# Patient Record
Sex: FEMALE | Employment: STUDENT | URBAN - METROPOLITAN AREA
[De-identification: names, ages, dates, MRNs, and addresses within clinical notes are randomized per-mention and may not be internally consistent; named-entity substitution may affect disease eponyms.]

---

## 2018-02-20 ENCOUNTER — OFFICE VISIT (OUTPATIENT)
Dept: ORTHOPEDICS | Facility: CLINIC | Age: 23
End: 2018-02-20
Payer: COMMERCIAL

## 2018-02-20 VITALS — RESPIRATION RATE: 16 BRPM | SYSTOLIC BLOOD PRESSURE: 104 MMHG | DIASTOLIC BLOOD PRESSURE: 64 MMHG | HEART RATE: 68 BPM

## 2018-02-20 DIAGNOSIS — S76.212A GROIN STRAIN, LEFT, INITIAL ENCOUNTER: Primary | ICD-10-CM

## 2018-02-20 NOTE — LETTER
2/20/2018      RE: Val Jenkins  [address] [facility] [address]       Sports Medicine Clinic Visit    PCP: No primary care provider on file.    Val Jenkins is a 22 year old female who is seen  as self referral presenting with left hip pain. The pt believes pain started after over stretching the hip flexor area. She dances and work out. The hip has improved with rest, ice, and ibuprofen. She wants to make sure she is alright.     Injury: None    Location of Pain: left groin pain  Duration of Pain: 2 week(s)  Pain is better with: Ice, Ibuprofen and Rest  Pain is worse with: Hip flexion, abduction, external rotation  Additional Features: None  Treatment so far consists of: Ice, ibuprofen, and rest  Prior History of related problems: None    /64 (BP Location: Right arm, Patient Position: Sitting, Cuff Size: Adult Regular)  Pulse 68  Resp 16         PMH:  Active problem list:  Patient Active Problem List   Diagnosis     Metatarsalgia of left foot       FH:  No family history on file.    SH:  Social History     Social History     Marital status: Single     Spouse name: N/A     Number of children: N/A     Years of education: N/A     Occupational History     Not on file.     Social History Main Topics     Smoking status: Never Smoker     Smokeless tobacco: Never Used     Alcohol use Not on file     Drug use: Not on file     Sexual activity: Not on file     Other Topics Concern     Not on file     Social History Narrative       MEDS:  See EMR, reviewed  ALL:  See EMR, reviewed    REVIEW OF SYSTEMS:  CONSTITUTIONAL:NEGATIVE for fever, chills, change in weight  INTEGUMENTARY/SKIN: NEGATIVE for worrisome rashes, moles or lesions  EYES: NEGATIVE for vision changes or irritation  ENT/MOUTH: NEGATIVE for ear, mouth and throat problems  RESP:NEGATIVE for significant cough or SOB  BREAST: NEGATIVE for masses, tenderness or discharge  CV: NEGATIVE for chest pain, palpitations or peripheral edema  GI:  NEGATIVE for nausea, abdominal pain, heartburn, or change in bowel habits  :NEGATIVE for frequency, dysuria, or hematuria  :NEGATIVE for frequency, dysuria, or hematuria  NEURO: NEGATIVE for weakness, dizziness or paresthesias  ENDOCRINE: NEGATIVE for temperature intolerance, skin/hair changes  HEME/ALLERGY/IMMUNE: NEGATIVE for bleeding problems  PSYCHIATRIC: NEGATIVE for changes in mood or affect        Subjective: 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 There was no forceful injury. She is involved in jazz and modern dance. She is taken time away, and done some gentle stretches, and feels that it's improving. She started to insert herself back into performance just avoiding those parts of dance that are at risk for discomfort. She denies issues with her hips in the past.    Objective: She has full range of motion of the left hip to internal rotation and external rotation and abduction. She is nontender over the anterior superior iliac spine and hip flexion against resistance does not cause discomfort. She is consistently tender over the adductor tubercle on the left and firing of the adductors against resistance reproduces her discomfort. She is nontender directly over the pubic symphysis. She has normal range of motion to the lumbar spine. The PSIS excurse normally. She is nontender over the sacroiliac joints and a Brittany test is negative.  Distal pulses and sensation are intact.    Assessment: Adductor tendon strain left hip     Plan: The problem is improving. We went over some Hold and release stretches for the adductors as well as the hip flexors with a gentle progression. She was able to do them without discomfort. We talked about an approach to return to dance. She'll graduate this year. She is performing at the [employer] and rehearsals again in a few months.              Carlitos Tinsley  MD Moe

## 2018-02-20 NOTE — PROGRESS NOTES
Sports Medicine Clinic Visit    PCP: No primary care provider on file.    Val Jenkins is a 22 year old female who is seen  as self referral presenting with left hip pain. The pt believes pain started after over stretching the hip flexor area. She dances and work out. The hip has improved with rest, ice, and ibuprofen. She wants to make sure she is alright.     Injury: None    Location of Pain: left groin pain  Duration of Pain: 2 week(s)  Pain is better with: Ice, Ibuprofen and Rest  Pain is worse with: Hip flexion, abduction, external rotation  Additional Features: None  Treatment so far consists of: Ice, ibuprofen, and rest  Prior History of related problems: None    /64 (BP Location: Right arm, Patient Position: Sitting, Cuff Size: Adult Regular)  Pulse 68  Resp 16         PMH:  Active problem list:  Patient Active Problem List   Diagnosis     Metatarsalgia of left foot       FH:  No family history on file.    SH:  Social History     Social History     Marital status: Single     Spouse name: N/A     Number of children: N/A     Years of education: N/A     Occupational History     Not on file.     Social History Main Topics     Smoking status: Never Smoker     Smokeless tobacco: Never Used     Alcohol use Not on file     Drug use: Not on file     Sexual activity: Not on file     Other Topics Concern     Not on file     Social History Narrative       MEDS:  See EMR, reviewed  ALL:  See EMR, reviewed    REVIEW OF SYSTEMS:  CONSTITUTIONAL:NEGATIVE for fever, chills, change in weight  INTEGUMENTARY/SKIN: NEGATIVE for worrisome rashes, moles or lesions  EYES: NEGATIVE for vision changes or irritation  ENT/MOUTH: NEGATIVE for ear, mouth and throat problems  RESP:NEGATIVE for significant cough or SOB  BREAST: NEGATIVE for masses, tenderness or discharge  CV: NEGATIVE for chest pain, palpitations or peripheral edema  GI: NEGATIVE for nausea, abdominal pain, heartburn, or change in bowel habits  :NEGATIVE for  frequency, dysuria, or hematuria  :NEGATIVE for frequency, dysuria, or hematuria  NEURO: NEGATIVE for weakness, dizziness or paresthesias  ENDOCRINE: NEGATIVE for temperature intolerance, skin/hair changes  HEME/ALLERGY/IMMUNE: NEGATIVE for bleeding problems  PSYCHIATRIC: NEGATIVE for changes in mood or affect        Subjective: 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 There was no forceful injury. She is involved in jazz and modern dance. She is taken time away, and done some gentle stretches, and feels that it's improving. She started to insert herself back into performance just avoiding those parts of dance that are at risk for discomfort. She denies issues with her hips in the past.    Objective: She has full range of motion of the left hip to internal rotation and external rotation and abduction. She is nontender over the anterior superior iliac spine and hip flexion against resistance does not cause discomfort. She is consistently tender over the adductor tubercle on the left and firing of the adductors against resistance reproduces her discomfort. She is nontender directly over the pubic symphysis. She has normal range of motion to the lumbar spine. The PSIS excurse normally. She is nontender over the sacroiliac joints and a Brittany test is negative.  Distal pulses and sensation are intact.    Assessment: Adductor tendon strain left hip     Plan: The problem is improving. We went over some Hold and release stretches for the adductors as well as the hip flexors with a gentle progression. She was able to do them without discomfort. We talked about an approach to return to dance. She'll graduate this year. She is performing at the [employer] and rehearsals again in a few months.

## 2018-02-20 NOTE — MR AVS SNAPSHOT
After Visit Summary   2018    Val Jenkins    MRN: [id number]           Patient Information     Date Of Birth          1995        Visit Information        Provider Department      2018 10:30 AM Carlitos Rosa MD [facility] Sports Medicine        Today's Diagnoses     Groin strain, left, initial encounter    -  1       Follow-ups after your visit        Who to contact     Please call your clinic at [phone] to:    Ask questions about your health    Make or cancel appointments    Discuss your medicines    Learn about your test results    Speak to your doctor            Additional Information About Your Visit        MyChart Information     [employer] is an electronic gateway that provides easy, online access to your medical records. With [employer], you can request a clinic appointment, read your test results, renew a prescription or communicate with your care team.     To sign up for [employer] visit the website at [email or web address]   You will be asked to enter the access code listed below, as well as some personal information. Please follow the directions to create your username and password.     Your access code is: [email or web address]  Expires: 2018  6:30 AM     Your access code will  in 90 days. If you need help or a new code, please contact your [facility] or call [phone] for assistance.        Care EveryWhere ID     This is your Care EveryWhere ID. This could be used by other organizations to access your [facility] medical records  [id number]        Your Vitals Were     Pulse Respirations                68 16           Blood Pressure from Last 3 Encounters:   18 104/64    Weight from Last 3 Encounters:   16 120 lb (54.4 kg)   16 120 lb (54.4 kg) (34 %)*     * Growth percentiles are based on CDC 2-20 Years data.              Today, you had the following     No orders found for display       Primary Care Provider     None Specified       No primary provider on file.        Equal Access to Services     WILMER GONZALEZ : Hadii aad ku hadmirelabasil Marino, waalexda tatyanamacielha, heatherkurt mullenhonorioneeraj caballero. So [facility] [phone].    ATENCIÓN: Si habla español, tiene a reed disposición servicios gratuitos de asistencia lingüística. Llame al [phone].    We comply with applicable federal civil rights laws and [place] laws. We do not discriminate on the basis of race, color, national origin, age, disability, sex, sexual orientation, or gender identity.            Thank you!     Thank you for choosing [facility]  for your care. Our goal is always to provide you with excellent care. Hearing back from our patients is one way we can continue to improve our services. Please take a few minutes to complete the written survey that you may receive in the mail after your visit with us. Thank you!             Your Updated Medication List - Protect others around you: Learn how to safely use, store and throw away your medicines at www.disposemymeds.org.          This list is accurate as of 2/20/18 11:08 AM.  Always use your most recent med list.                   Brand Name Dispense Instructions for use Diagnosis    LEXAPRO PO      Take 12.5 mg by mouth daily        norethin-eth estrad triphasic 0.5/1/0.5-35 MG-MCG per tablet    ARANELLRAIN, PAKO, TRI-NORINYL          omeprazole 10 MG CR capsule    priLOSEC          sertraline 12.5 mg Tabs half-tab    ZOLOFT     Take 12.5 mg by mouth daily

## 2018-05-16 ENCOUNTER — APPOINTMENT (OUTPATIENT)
Dept: GENERAL RADIOLOGY | Facility: CLINIC | Age: 23
End: 2018-05-16
Attending: EMERGENCY MEDICINE
Payer: OTHER MISCELLANEOUS

## 2018-05-16 ENCOUNTER — APPOINTMENT (OUTPATIENT)
Dept: ULTRASOUND IMAGING | Facility: CLINIC | Age: 23
End: 2018-05-16
Attending: EMERGENCY MEDICINE
Payer: OTHER MISCELLANEOUS

## 2018-05-16 ENCOUNTER — HOSPITAL ENCOUNTER (EMERGENCY)
Facility: CLINIC | Age: 23
Discharge: HOME | End: 2018-05-17
Attending: EMERGENCY MEDICINE | Admitting: EMERGENCY MEDICINE
Payer: OTHER MISCELLANEOUS

## 2018-05-16 DIAGNOSIS — M25.561 ACUTE PAIN OF RIGHT KNEE: ICD-10-CM

## 2018-05-16 PROCEDURE — 93971 EXTREMITY STUDY: CPT | Mod: RT

## 2018-05-16 PROCEDURE — 73562 X-RAY EXAM OF KNEE 3: CPT | Mod: RT

## 2018-05-16 PROCEDURE — 99284 EMERGENCY DEPT VISIT MOD MDM: CPT | Mod: 25 | Performed by: EMERGENCY MEDICINE

## 2018-05-16 PROCEDURE — 99283 EMERGENCY DEPT VISIT LOW MDM: CPT | Mod: Z6 | Performed by: EMERGENCY MEDICINE

## 2018-05-16 ASSESSMENT — ENCOUNTER SYMPTOMS
FATIGUE: 0
ABDOMINAL PAIN: 0
ARTHRALGIAS: 1
FEVER: 0
SHORTNESS OF BREATH: 0

## 2018-05-16 NOTE — ED AVS SNAPSHOT
[facility], Emergency Department    [address]    [place] [address]    Phone:  [phone]    Fax:  [phone]                                       Val Jenkins   MRN: [id number]    Department:  [facility], Emergency Department   Date of Visit:  5/16/2018           Patient Information     Date Of Birth          1995        Your diagnoses for this visit were:     Acute pain of right knee        You were seen by Iraida Brown MD.        Discharge Instructions       Please make an appointment to follow up with Orthopedics (phone: [phone]) as soon as possible.    You should receive a call from [facility] to schedule your follow up appointment. If you do not hear from them within 24 business hours, call [phone], option 3 for help in scheduling your follow up.  If you are seen in the Emergency Department over the weekend, you will receive a phone call on the next business day.     You may use topical voltaren cream to help with pain.  Continue to ice, rest and elevate as much as possible.      Knee Pain  Knee pain is very common. It s especially common in active people who put a lot of pressure on their knees, like runners. It affects women more often than men.  Your kneecap (patella) is a thick, round bone. It covers and protects the front portion of your knee joint. It moves along a groove in your thighbone (femur) as part of the patellofemoral joint. A layer of cartilage surrounds the underside of your kneecap. This layer protects it from grinding against your femur.  When this cartilage softens and breaks down, it can cause knee pain. This is partly because of repetitive stress. The stress irritates the lining of the joint. This causes pain in the underlying bone.  What causes knee pain?  Many things can cause knee pain. You may have more than one cause. Some of these include:    Overuse of the knee joint    The kneecap doesn t line up with the  tissue around it    Damage to small nerves in the area    Damage to the ligament-like structure that holds the kneecap in place (retinaculum)    Breakdown of the bone under the cartilage    Swelling in the soft tissues around the kneecap    Injury  You might be more likely to have knee pain if you:    Exercise a lot    Recently increased the intensity of your workouts    Have a body mass index (BMI) greater than 25    Have poor alignment of your kneecap    Walk with your feet turned overly outward or inward    Have weakness in surrounding muscle groups (inner quad or hip adductor muscles)    Have too much tightness in surrounding muscle groups (hamstrings or iliotibial band)    Have a recent history of injury to the area    Are female  Symptoms of knee pain  This type of knee pain is a dull, aching pain in the front of the knee in the area under and around the kneecap. This pain may start quickly or slowly. Your pain might be worse when you squat, run, or sit for a long time. You might also sometimes feel like your knee is giving out. You may have symptoms in one or both of your knees.  Diagnosing knee pain  Your healthcare provider will ask about your medical history and your symptoms. Be sure to describe any activities that make your knee pain worse. He or she will look at your knee. This will include tests of your range of motion, strength, and areas of pain of your knee. Your knee alignment will be checked.  Your healthcare provider will need to rule out other causes of your knee pain, such as arthritis. You may need an imaging test, such as an X-ray or MRI.  Treatment for knee pain  Treatments that can help ease your symptoms may include:    Avoiding activities for a while that make your pain worse, returning to activity over time    Icing the outside of your knee when it causes you pain    Taking over-the-counter pain medicine    Wearing a knee brace or taping your knee to support it    Wearing special shoe  inserts to help keep your feet in the proper alignment    Doing special exercises to stretch and strengthen the muscles around your hip and your knee  These steps help most people manage knee pain. But some cases of knee pain need to be treated with surgery. You may need surgery right away. Or you may need it later if other treatments don t work. Your healthcare provider may refer you to an orthopedic surgeon. He or she will talk with you about your choices.  Preventing knee pain  Losing weight and correcting excess muscle tightness or muscle weakness may help lower your risk.  In some cases, you can prevent knee pain. To help prevent a flare-up of knee pain, you do these things:    Regularly do all the exercises your doctor or physical therapist advises    Support your knee as advised by your doctor or physical therapist    Increase training gradually, and ease up on training when needed    Have an expert check your gait for running or other sporting activities    Stretch properly before and after exercise    Replace your running shoes regularly    Lose excess weight      When to call your healthcare provider  Call your healthcare provider right away if:    Your symptoms don t get better after a few weeks of treatment    You have any new symptoms   Date Last Reviewed: 4/1/2017 2000-2017 [employer]. [address] [place] [address]. All rights reserved. This information is not intended as a substitute for professional medical care. Always follow your healthcare professional's instructions.               24 Hour Appointment Hotline       To make an appointment at any [facility], call [phone] ([phone]). If you don't have a family doctor or clinic, we will help you find one. [facility] clinics are conveniently located to serve the needs of you and your family.          ED Discharge Orders     Follow up with Orthopaedics [facility]       You should receive a call from [facility] to schedule your follow up appointment. If you do not hear from them within 24 business hours, call [phone], option 3 for help in scheduling your follow up.  If you are seen in the Emergency Department over the weekend, you will receive a phone call on the next business day.                     Review of your medicines      START taking        Dose / Directions Last dose taken    diclofenac 1 % Gel topical gel   Commonly known as:  VOLTAREN   Dose:  4 g   Quantity:  100 g        Place 4 g onto the skin 4 times daily as needed for moderate pain Apply to right knee   Refills:  0          Our records show that you are taking the medicines listed below. If these are incorrect, please call your family doctor or clinic.        Dose / Directions Last dose taken    norethin-eth estrad triphasic 0.5/1/0.5-35 MG-MCG per tablet   Commonly known as:  ARANELLE, PAKO, TRI-NORINYL        Refills:  0                Prescriptions were sent or printed at these locations (1 Prescription)                   Other Prescriptions                Printed at Department/Unit printer (1 of 1)         diclofenac (VOLTAREN) 1 % GEL topical gel                Procedures and tests performed during your visit     US Lower Extremity Venous Duplex Right    XR Knee Right 3 Views      Orders Needing Specimen Collection     None      Pending Results     Date and Time Order Name Status Description    5/16/2018 2145 US Lower Extremity Venous Duplex Right Preliminary     5/16/2018 2145 XR Knee Right 3 Views Preliminary             Pending Culture Results     No orders found for last 3 day(s).            Pending Results Instructions     If you had any lab results that were not finalized at the time of your Discharge, you can call the ED Lab Result RN at [phone]. You will be contacted by this team for any positive Lab results or changes in treatment. The nurses are available 7 days a week from 10A to 6:30P.  You can leave a message  "24 hours per day and they will return your call.        Thank you for choosing [facility]       Thank you for choosing [facility] for your care. Our goal is always to provide you with excellent care. Hearing back from our patients is one way we can continue to improve our services. Please take a few minutes to complete the written survey that you may receive in the mail after you visit with us. Thank you!        [facility] Information     [employer] lets you send messages to your doctor, view your test results, renew your prescriptions, schedule appointments and more. To sign up, go to [email or web address] . Click on \"Log in\" on the left side of the screen, which will take you to the Welcome page. Then click on \"Sign up Now\" on the right side of the page.     You will be asked to enter the access code listed below, as well as some personal information. Please follow the directions to create your username and password.     Your access code is: [email or web address]  Expires: 2018  7:30 AM     Your access code will  in 90 days. If you need help or a new code, please call your [facility] clinic or [phone].        Care EveryWhere ID     This is your Care EveryWhere ID. This could be used by other organizations to access your [facility] medical records  [id number]        Equal Access to Services     WILMER GONZALEZ : Julius Marino, waaxda luqadaha, qaybta kaalmada adecaroleyaetienne, neeraj maloney. So [facility] [phone].    ATENCIÓN: Si habla español, tiene a reed disposición servicios gratuitos de asistencia lingüística. Llame al [phone].    We comply with applicable federal civil rights laws and [place] laws. We do not discriminate on the basis of race, color, national origin, age, disability, sex, sexual orientation, or gender identity.            After Visit Summary       This is your record. Keep this with you and show to your community pharmacist(s) and doctor(s) at your next " visit.

## 2018-05-16 NOTE — ED AVS SNAPSHOT
[facility], [place], Emergency Department    [address]    [facility] [address]    Phone:  [phone]    Fax:  [phone]                                       Val Jenkins   MRN: [id number]    Department:  [facility], Emergency Department   Date of Visit:  5/16/2018           After Visit Summary Signature Page     I have received my discharge instructions, and my questions have been answered. I have discussed any challenges I see with this plan with the nurse or doctor.    ..........................................................................................................................................  Patient/Patient Representative Signature      ..........................................................................................................................................  Patient Representative Print Name and Relationship to Patient    ..................................................               ................................................  Date                                            Time    ..........................................................................................................................................  Reviewed by Signature/Title    ...................................................              ..............................................  Date                                                            Time

## 2018-05-17 ENCOUNTER — OFFICE VISIT (OUTPATIENT)
Dept: ORTHOPEDICS | Facility: CLINIC | Age: 23
End: 2018-05-17
Payer: COMMERCIAL

## 2018-05-17 ENCOUNTER — RADIANT APPOINTMENT (OUTPATIENT)
Dept: MRI IMAGING | Facility: CLINIC | Age: 23
End: 2018-05-17
Attending: FAMILY MEDICINE
Payer: COMMERCIAL

## 2018-05-17 ENCOUNTER — TELEPHONE (OUTPATIENT)
Dept: ORTHOPEDICS | Facility: CLINIC | Age: 23
End: 2018-05-17

## 2018-05-17 VITALS
HEIGHT: 64 IN | BODY MASS INDEX: 21.34 KG/M2 | WEIGHT: 125 LBS | DIASTOLIC BLOOD PRESSURE: 81 MMHG | SYSTOLIC BLOOD PRESSURE: 117 MMHG | HEART RATE: 72 BPM

## 2018-05-17 VITALS
DIASTOLIC BLOOD PRESSURE: 81 MMHG | WEIGHT: 125.1 LBS | BODY MASS INDEX: 21.47 KG/M2 | SYSTOLIC BLOOD PRESSURE: 117 MMHG | RESPIRATION RATE: 16 BRPM | HEART RATE: 72 BPM | TEMPERATURE: 98.2 F | OXYGEN SATURATION: 99 %

## 2018-05-17 DIAGNOSIS — M22.2X1 PATELLOFEMORAL PAIN SYNDROME OF RIGHT KNEE: Primary | ICD-10-CM

## 2018-05-17 DIAGNOSIS — M25.561 RIGHT ANTERIOR KNEE PAIN: Primary | ICD-10-CM

## 2018-05-17 NOTE — TELEPHONE ENCOUNTER
Called patient to get her onto a sports medicine schedule.     Appointment with Dr. Tsai was offered on Monday at 4:30pm.     Patient states she will schedule but may show up to the Sports and ortho walk in clinic before then.     Sara Silverio LPN

## 2018-05-17 NOTE — ED PROVIDER NOTES
History     Chief Complaint   Patient presents with     Knee Pain     Right knee pain swelling due to dancing. Has used Ibuprofen, elevation, and iced it with some relief.  Denies numbness and tingling. Seeing MRI per patient on knee.     HPI  Val Jenkins is a 23 year old female with no significant medical history who presents to the Emergency Department for evaluation of right knee pain. 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  Pain is primarily in the anterior knee along the patellar tendon insertion but also notes fullness in the posterior knee and pain radiating toward her hamstring and calf.  She denies any specific injury to the knee.  Denies any sensation of locking or clicking of the knee.  She states she went home and used ice, Advil, and elevation; however, she states she danced again today and noticed the pain was getting worse with some associated posterior knee and calf tightness.  She states she still has full range of motion, but thinks she has some mild swelling in her knee.  She denies any calf swelling.  She denies any history of PE/DVT.  She is on an oral contraceptive.  She denies any tobacco use.  She denies any other recent illness.  She is quite concerned about the cause of the pain because she is rehearsing daily and unable to take time off prior to an upcoming performance.      History reviewed. No pertinent past medical history.    History reviewed. No pertinent surgical history.    No family history on file.    Social History   Substance Use Topics     Smoking status: Never Smoker     Smokeless tobacco: Never Used     Alcohol use Yes       No current facility-administered medications for this encounter.      Current Outpatient Prescriptions   Medication     norethin-eth estrad triphasic (ARANELLE, PAKO, TRI-NORINYL) 0.5/1/0.5-35 MG-MCG per tablet      No Known Allergies      I have reviewed the Medications,  Allergies, Past Medical and Surgical History, and Social History in the Epic system.    Review of Systems   Constitutional: Negative for fatigue and fever.   Respiratory: Negative for shortness of breath.    Cardiovascular: Negative for chest pain and leg swelling.   Gastrointestinal: Negative for abdominal pain.   Musculoskeletal: Positive for arthralgias (right knee).   All other systems reviewed and are negative.      Physical Exam   BP: 112/74  Pulse: 75  Temp: 98.1  F (36.7  C)  Resp: 16  Weight: 56.7 kg (125 lb 1.6 oz)  SpO2: 98 %      Physical Exam  General: patient is alert and oriented and in no acute distress   Head: atraumatic and normocephalic   EENT: moist mucus membranes, sclera anicteric   Neck: supple   Cardiovascular: regular rate and rhythm, extremities warm and well perfused, no lower extremity edema, 2+DP and PT pulses  Pulmonary: no respiratory distress  Musculoskeletal: trace right knee effusion, TTP in the superior lateral knee and patellar tendon insertion, TTP in the posterior knee, able to fully flex and extend the knee, no laxity on varus/valgus strain, no laxity with anterior/posterior drawer, strength 5/5 with knee flexion and extension, full hip and ankle ROM  Neurological: alert and oriented, moving all extremities symmetrically, gait normal, sensation to light touch in RLE intact  Skin: warm, dry, no overlying warmth or erythema of the right knee    ED Course     ED Course     Procedures             Critical Care time:  none             Labs Ordered and Resulted from Time of ED Arrival Up to the Time of Departure from the ED - No data to display         Assessments & Plan (with Medical Decision Making)   23-year-old otherwise healthy female who presents with right knee pain.  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  She does report a fullness in the back of her knee as well as extending into her hamstring and calf.  She is on oral birth control and she  was taken for ultrasound to rule out DVT or Baker's cyst which is negative.  She did have x-rays of the knee that shows no acute fracture or bony abnormality.  She has no evidence of septic arthritis, gout, cellulitis or other emergent pathology.  She is neurovascularly intact.  Her pain is along the quadriceps and patella tendon insert.  I suspect this is secondary to strain due to overuse.  She does not have significant laxity on varus or valgus strain and has negative anterior and posterior drawer.  She denies any locking of the knee and does not have significant swelling to suggest meniscal injury.  She is quite concerned because she is supposed to be practicing daily for an upcoming dance performance and is unable to take time off to allow the need to rest and heal.  I have given her a referral for sports medicine clinic to be seen as soon as possible for further outpatient imaging as needed.  She was given Voltaren cream in the meantime and instructed to ice, rest and elevate as much as possible.  She was given close return instructions for the emergency department and voiced understanding.    I have reviewed the nursing notes.    I have reviewed the findings, diagnosis, plan and need for follow up with the patient.    Discharge Medication List as of 5/17/2018 12:35 AM      START taking these medications    Details   diclofenac (VOLTAREN) 1 % GEL topical gel Place 4 g onto the skin 4 times daily as needed for moderate pain Apply to right kneeDisp-100 g, R-0Local Print             Final diagnoses:   Acute pain of right knee   I, Camilo Lowery, am serving as a trained medical scribe to document services personally performed by Iraida Brown MD, based on the provider's statements to me.      IIraida MD, was physically present and have reviewed and verified the accuracy of this note documented by Camilo Lowery.       5/16/2018   [facility], EMERGENCY DEPARTMENT     Iraida Brown MD  05/17/18  100

## 2018-05-17 NOTE — DISCHARGE INSTRUCTIONS
Please make an appointment to follow up with Orthopedics (phone: [phone]) as soon as possible.    You should receive a call from [facility] to schedule your follow up appointment. If you do not hear from them within 24 business hours, call [phone], option 3 for help in scheduling your follow up.  If you are seen in the Emergency Department over the weekend, you will receive a phone call on the next business day.     You may use topical voltaren cream to help with pain.  Continue to ice, rest and elevate as much as possible.      Knee Pain  Knee pain is very common. It s especially common in active people who put a lot of pressure on their knees, like runners. It affects women more often than men.  Your kneecap (patella) is a thick, round bone. It covers and protects the front portion of your knee joint. It moves along a groove in your thighbone (femur) as part of the patellofemoral joint. A layer of cartilage surrounds the underside of your kneecap. This layer protects it from grinding against your femur.  When this cartilage softens and breaks down, it can cause knee pain. This is partly because of repetitive stress. The stress irritates the lining of the joint. This causes pain in the underlying bone.  What causes knee pain?  Many things can cause knee pain. You may have more than one cause. Some of these include:    Overuse of the knee joint    The kneecap doesn t line up with the tissue around it    Damage to small nerves in the area    Damage to the ligament-like structure that holds the kneecap in place (retinaculum)    Breakdown of the bone under the cartilage    Swelling in the soft tissues around the kneecap    Injury  You might be more likely to have knee pain if you:    Exercise a lot    Recently increased the intensity of your workouts    Have a body mass index (BMI) greater than 25    Have poor alignment of your kneecap    Walk with your feet turned overly outward or  inward    Have weakness in surrounding muscle groups (inner quad or hip adductor muscles)    Have too much tightness in surrounding muscle groups (hamstrings or iliotibial band)    Have a recent history of injury to the area    Are female  Symptoms of knee pain  This type of knee pain is a dull, aching pain in the front of the knee in the area under and around the kneecap. This pain may start quickly or slowly. Your pain might be worse when you squat, run, or sit for a long time. You might also sometimes feel like your knee is giving out. You may have symptoms in one or both of your knees.  Diagnosing knee pain  Your healthcare provider will ask about your medical history and your symptoms. Be sure to describe any activities that make your knee pain worse. He or she will look at your knee. This will include tests of your range of motion, strength, and areas of pain of your knee. Your knee alignment will be checked.  Your healthcare provider will need to rule out other causes of your knee pain, such as arthritis. You may need an imaging test, such as an X-ray or MRI.  Treatment for knee pain  Treatments that can help ease your symptoms may include:    Avoiding activities for a while that make your pain worse, returning to activity over time    Icing the outside of your knee when it causes you pain    Taking over-the-counter pain medicine    Wearing a knee brace or taping your knee to support it    Wearing special shoe inserts to help keep your feet in the proper alignment    Doing special exercises to stretch and strengthen the muscles around your hip and your knee  These steps help most people manage knee pain. But some cases of knee pain need to be treated with surgery. You may need surgery right away. Or you may need it later if other treatments don t work. Your healthcare provider may refer you to an orthopedic surgeon. He or she will talk with you about your choices.  Preventing knee pain  Losing weight and  correcting excess muscle tightness or muscle weakness may help lower your risk.  In some cases, you can prevent knee pain. To help prevent a flare-up of knee pain, you do these things:    Regularly do all the exercises your doctor or physical therapist advises    Support your knee as advised by your doctor or physical therapist    Increase training gradually, and ease up on training when needed    Have an expert check your gait for running or other sporting activities    Stretch properly before and after exercise    Replace your running shoes regularly    Lose excess weight      When to call your healthcare provider  Call your healthcare provider right away if:    Your symptoms don t get better after a few weeks of treatment    You have any new symptoms   Date Last Reviewed: 4/1/2017 2000-2017 [employer]. [address] [place] [address], [place] [address]. All rights reserved. This information is not intended as a substitute for professional medical care. Always follow your healthcare professional's instructions.

## 2018-05-17 NOTE — MR AVS SNAPSHOT
After Visit Summary   5/17/2018    Val Jenkins    MRN: [id number]           Patient Information     Date Of Birth          1995        Visit Information        Provider Department      5/17/2018 9:40 AM Doug Tsai MD [facility]        Today's Diagnoses     Right anterior knee pain    -  1       Follow-ups after your visit        Additional Services     PHYSICAL THERAPY REFERRAL (Internal)       Physical Therapy Referral                  Your next 10 appointments already scheduled     May 17, 2018  4:00 PM CDT   MR KNEE RIGHT W/O CONTRAST with [id number]   [facility] [place] ([facility])    [address] [address] [phone]           Take your medicines as usual, unless your doctor tells you not to. Bring a list of your current medicines to your exam (including vitamins, minerals and over-the-counter drugs). Also bring the results of similar scans you may have had.  Please remove any body piercings and hair extensions before you arrive.  Follow your doctor s orders. If you do not, we may have to postpone your exam.  You may or may not receive IV contrast for this exam pending the discretion of the Radiologist.  You do not need to do anything special to prepare.  The MRI machine uses a strong magnet. Please wear clothes without metal (snaps, zippers). A sweatsuit works well, or we may give you a hospital gown.   **IMPORTANT** THE INSTRUCTIONS BELOW ARE ONLY FOR THOSE PATIENTS WHO HAVE BEEN PRESCRIBED SEDATION OR GENERAL ANESTHESIA DURING THEIR MRI PROCEDURE:  IF YOUR DOCTOR PRESCRIBED ORAL SEDATION (take medicine to help you relax during your exam):   You must get the medicine from your doctor (oral medication) before you arrive. Bring the medicine to the exam. Do not take it at home. You ll be told when to take it upon arriving for your exam.   Arrive one hour early. Bring  someone who can take you home after the test. Your medicine will make you sleepy. After the exam, you may not drive, take a bus or take a taxi by yourself.  IF YOUR DOCTOR PRESCRIBED IV SEDATION:   Arrive one hour early. Bring someone who can take you home after the test. Your medicine will make you sleepy. After the exam, you may not drive, take a bus or take a taxi by yourself.   No eating 6 hours before your exam. You may have clear liquids up until 4 hours before your exam. (Clear liquids include water, clear tea, black coffee and fruit juice without pulp.)  IF YOUR DOCTOR PRESCRIBED ANESTHESIA (be asleep for your exam):   Arrive 1 1/2 hours early. Bring someone who can take you home after the test. You may not drive, take a bus or take a taxi by yourself.   No eating 8 hours before your exam. You may have clear liquids up until 4 hours before your exam. (Clear liquids include water, clear tea, black coffee and fruit juice without pulp.)   You will spend four to five hours in the recovery room.  Please call the Imaging Department at your exam site with any questions.            May 21, 2018  3:30 PM CDT   (Arrive by 3:15 PM)   DWIGHT Extremity with Deep Santamaria PT   [facility]    [address] [place] [address]  [address]  [place] [address] [phone]            May 21, 2018  4:30 PM CDT   (Arrive by 4:15 PM)   New Patient Visit with Doug Tsai MD   [facility] ([facility])    [address] [facility]  5th Floor  [facility] [address] [phone]              Future tests that were ordered for you today     Open Future Orders        Priority Expected Expires Ordered    MR Knee Right w/o Contrast Routine  5/17/2019 5/17/2018            Who to contact     Please call your clinic at [phone] to:    Ask questions about your health    Make or cancel appointments    Discuss your  "medicines    Learn about your test results    Speak to your doctor            Additional Information About Your Visit        MyChart Information     [employer] is an electronic gateway that provides easy, online access to your medical records. With [employer], you can request a clinic appointment, read your test results, renew a prescription or communicate with your care team.     To sign up for [employer] visit the website at [email or web address]   You will be asked to enter the access code listed below, as well as some personal information. Please follow the directions to create your username and password.     Your access code is: [email or web address]  Expires: 8/15/2018 10:43 AM     Your access code will  in 90 days. If you need help or a new code, please contact your [facility] or call [phone] for assistance.        Care EveryWhere ID     This is your Care EveryWhere ID. This could be used by other organizations to access your [facility] medical records  [id number]        Your Vitals Were     Pulse Height Last Period BMI (Body Mass Index)          72 5' 4\" (1.626 m) 05/15/2018 21.46 kg/m2         Blood Pressure from Last 3 Encounters:   18 117/81   18 117/81   18 104/64    Weight from Last 3 Encounters:   18 125 lb (56.7 kg)   18 125 lb 1.6 oz (56.7 kg)   16 120 lb (54.4 kg)              We Performed the Following     PHYSICAL THERAPY REFERRAL (Internal)        Primary Care Provider Fax #    Physician No Ref-Primary [phone]       No address on file        Equal Access to Services     [facility]: Hadii sarika arreaga Somaria fernanda, waaxda luqadaha, qaybta kaalmada neeraj borja . So [facility] [phone].    ATENCIÓN: Si habla español, tiene a reed disposición servicios gratuitos de asistencia lingüística. Llame al [phone].    We comply with applicable federal civil rights laws and [place] laws. We do not " discriminate on the basis of race, color, national origin, age, disability, sex, sexual orientation, or gender identity.            Thank you!     Thank you for choosing [facility]  for your care. Our goal is always to provide you with excellent care. Hearing back from our patients is one way we can continue to improve our services. Please take a few minutes to complete the written survey that you may receive in the mail after your visit with us. Thank you!             Your Updated Medication List - Protect others around you: Learn how to safely use, store and throw away your medicines at www.disposemymeds.org.          This list is accurate as of 5/17/18 10:43 AM.  Always use your most recent med list.                   Brand Name Dispense Instructions for use Diagnosis    diclofenac 1 % Gel topical gel    VOLTAREN    100 g    Place 4 g onto the skin 4 times daily as needed for moderate pain Apply to right knee        norethin-eth estrad triphasic 0.5/1/0.5-35 MG-MCG per tablet    PAKO BENSON TRI-NORINYL

## 2018-05-17 NOTE — PROGRESS NOTES
SPORTS & ORTHOPEDIC WALK-IN VISIT 5/17/2018    Primary Care Physician: Naga  2 days ago while dancing for rehearsal noticed some pain in her right knee. Started rehearsal about 2 days ago which she is dancing 4-5 hrs a day. Notice lateral knee pain. Denies any injury or hx of R knee pain.  Seen in ED yesterday. XR and US done- Both normal. Given RX for Voltaren gel  Reason for visit:     What part of your body is injured / painful?  right knee    What caused the injury /pain? Dancing    How long ago did your injury occur or pain begin? several days ago    What are your most bothersome symptoms? Pain    How would you characterize your symptom?  aching and sharp    What makes your symptoms better? Rest, Ice, Ibuprofen and Tylenol    What makes your symptoms worse? Other: dancing, feels when extended    Have you been previously seen for this problem? Yes, ED 5/16/18    Medical History:    Any recent changes to your medical history? No    Any new medication prescribed since last visit? No    Have you had surgery on this body part before? No    Social History:    Occupation: Student    Handedness: Right    Exercise: Dancing, [employer]    Review of Systems:    Do you have fever, chills, weight loss? No    Do you have any vision problems? No    Do you have any chest pain or edema? No    Do you have any shortness of breath or wheezing?  No    Do you have stomach problems? No    Do you have any numbness or focal weakness? No    Do you have diabetes? No    Do you have problems with bleeding or clotting? No    Do you have an rashes or other skin lesions? 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  Came on gradulally. Had discomfort 3 weeks ago in a show with squats and kneeling.  Then 2 days ago she started having discomfort iced and elevated and yesterday more pain.   She is a recent graduate of [email or web address] and has increased the volume of dancing  "the past few weeks in training for a production of [employer] which runs through August. She could not continue a rehearsal yesterday due to increasing R knee pain. She iced and was seen in ED with neg radiograph.  No popping or clicking. Pain worse with releve and knee extension, also in heels.        Hx achilles tendinitis in the L leg and calf.  In PT for her hip at the time.  Doing PT at [facility] for L hip impingment.    Feels diet is adequate, no restriction, weight is stable, on OCP.      Past Medical and Surgical History  No past medical history on file.       No Known Allergies  Current Outpatient Prescriptions   Medication Sig Dispense Refill     diclofenac (VOLTAREN) 1 % GEL topical gel Place 4 g onto the skin 4 times daily as needed for moderate pain Apply to right knee 100 g 0     norethin-eth estrad triphasic (ARANELLE, PAKO, TRI-NORINYL) 0.5/1/0.5-35 MG-MCG per tablet          Family Hx remarkable for   Val Jenkins does not use tobacco      ROS:  Constitutional no fevers sweats or chills  Eyes no vision change  Respiratory, no sob cough wheezing,   Cardiovascular no syncope, chest pain or palpitaitons,   Gastroenterology, no nausea, vomiting or abd pain, no stool incontinence  Genitourinary, no dysuria, no  Frequency, no urinary incontinence, no urinary retention  Integumentary-no recent rashes  Musculoskeletal see HPI otherwise negative  Psychiatric no depression or anxiety  Heme-no abnormal bleeding      Objective  /81  Pulse 72  Ht 5' 4\" (1.626 m)  Wt 125 lb (56.7 kg)  LMP 05/15/2018  BMI 21.46 kg/m2  Gen alert and pleasant    rightKnee Exam  No joint effusion, No redness;Tenderness superolateral pf facet prox patellar tendon and just posterior, also mid posterior knee; ROM without deficit; Strength 5/5 ext, 5/5 flexion;No pain or opening with varus or valgus stress test; Neg Lachmans; Neg McMurrays; pain with repetitive Squat test at the pf facet ; Neg patellar " apprehension test,  hip IR/ER did not cause pain  Pain with knee extension    Assessment  R knee pain    I suspect this is R patellofemoral knee pain and patellar tendinitis, the pain with extension could suggest hoffa's impingement. Within the differential of symptoms could be anterior meniscal pathology or stress reaction.    Plan  --we discussed given she is ramping up with a production and has increasing pain for an important production and the differential diagnosis we discussed MRI R knee today. If neg for stress reaction, anterior mensical injury or additional unstable injury, I would have her progress with activity as tolerated as well as support from dance therapist. We discussed Yolanda Hooks of [facility] or Elana Cook of [facility]  As potential dance therapists to eval and treat.    Doug Tsai MD CA[facility]

## 2018-05-17 NOTE — LETTER
5/17/2018     RE: Val Jenkins  [address]   [facility] [address]     Dear Colleague,    Thank you for referring your patient, Val Jenkins, to the [facility] SPORTS AND ORTHOPAEDIC WALK IN CLINIC at [facility]. Please see a copy of my visit note below.          SPORTS & ORTHOPEDIC WALK-IN VISIT 5/17/2018    Primary Care Physician: Naga  2 days ago while dancing for rehearsal noticed some pain in her right knee. Started rehearsal about 2 days ago which she is dancing 4-5 hrs a day. Notice lateral knee pain. Denies any injury or hx of R knee pain.  Seen in ED yesterday. XR and US done- Both normal. Given RX for Voltaren gel  Reason for visit:     What part of your body is injured / painful?  right knee    What caused the injury /pain? Dancing    How long ago did your injury occur or pain begin? several days ago    What are your most bothersome symptoms? Pain    How would you characterize your symptom?  aching and sharp    What makes your symptoms better? Rest, Ice, Ibuprofen and Tylenol    What makes your symptoms worse? Other: dancing, feels when extended    Have you been previously seen for this problem? Yes, ED 5/16/18    Medical History:    Any recent changes to your medical history? No    Any new medication prescribed since last visit? No    Have you had surgery on this body part before? No    Social History:    Occupation: Student    Handedness: Right    Exercise: Dancing, Orange [employer]    Review of Systems:    Do you have fever, chills, weight loss? No    Do you have any vision problems? No    Do you have any chest pain or edema? No    Do you have any shortness of breath or wheezing?  No    Do you have stomach problems? No    Do you have any numbness or focal weakness? No    Do you have diabetes? No    Do you have problems with bleeding or clotting? No    Do you have an rashes or other skin lesions? 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  Came on gradulally. Had discomfort 3 weeks ago in a show with squats and kneeling.  Then 2 days ago she started having discomfort iced and elevated and yesterday more pain.   She is a recent graduate of [employer] and has increased the volume of dancing the past few weeks in training for a production of [employer] at the [employer] which runs through August. She could not continue a rehearsal yesterday due to increasing R knee pain. She iced and was seen in ED with neg radiograph.  No popping or clicking. Pain worse with releve and knee extension, also in heels.      Hx achilles tendinitis in the L leg and calf.  In PT for her hip at the time.  Doing PT at [facility] for L hip impingment.    Feels diet is adequate, no restriction, weight is stable, on OCP.      Past Medical and Surgical History  No past medical history on file.       No Known Allergies  Current Outpatient Prescriptions   Medication Sig Dispense Refill     diclofenac (VOLTAREN) 1 % GEL topical gel Place 4 g onto the skin 4 times daily as needed for moderate pain Apply to right knee 100 g 0     norethin-eth estrad triphasic (ARANELLE, PAKO, TRI-NORINYL) 0.5/1/0.5-35 MG-MCG per tablet        Family Hx remarkable for   Val Quiñonessamantha does not use tobacco      ROS:  Constitutional no fevers sweats or chills  Eyes no vision change  Respiratory, no sob cough wheezing,   Cardiovascular no syncope, chest pain or palpitaitons,   Gastroenterology, no nausea, vomiting or abd pain, no stool incontinence  Genitourinary, no dysuria, no  Frequency, no urinary incontinence, no urinary retention  Integumentary-no recent rashes  Musculoskeletal see HPI otherwise negative  Psychiatric no depression or anxiety  Heme-no abnormal bleeding    Objective  /81  Pulse 72  Ht 5' 4\" (1.626 m)  Wt 125 lb (56.7 kg)  LMP 05/15/2018  BMI 21.46 kg/m2  Gen alert and pleasant    rightKnee Exam  No joint " effusion, No redness;Tenderness superolateral pf facet prox patellar tendon and just posterior, also mid posterior knee; ROM without deficit; Strength 5/5 ext, 5/5 flexion;No pain or opening with varus or valgus stress test; Neg Lachmans; Neg McMurrays; pain with repetitive Squat test at the pf facet ; Neg patellar apprehension test,  hip IR/ER did not cause pain  Pain with knee extension    Assessment  R knee pain    I suspect this is R patellofemoral knee pain and patellar tendinitis, the pain with extension could suggest hoffa's impingement. Within the differential of symptoms could be anterior meniscal pathology or stress reaction.    Plan  --we discussed given she is ramping up with a production and has increasing pain for an important production and the differential diagnosis we discussed MRI R knee today. If neg for stress reaction, anterior mensical injury or additional unstable injury, I would have her progress with activity as tolerated as well as support from dance therapist. We discussed Yolanda Hooks of [facility] or Elana Cook of [facility]  As potential dance therapists to eval and treat.    Doug Tsai MD CAQ

## 2018-05-17 NOTE — TELEPHONE ENCOUNTER
[facility] Call Center    Phone Message    May a detailed message be left on voicemail: yes    Reason for Call: pt calling because she wanted to let Dr Tsai know   Her knee is painful when walking which was not happening before she came to the appt. Is it ok to still dance?    Action Taken: Message routed to:  [facility] Sports Med

## 2018-05-17 NOTE — ED NOTES
Handoff report to PANKAJ Braswell.  Informed of course of ED stay and plan of care.  Michaelle verbalized understanding.

## 2018-05-21 ENCOUNTER — THERAPY VISIT (OUTPATIENT)
Dept: PHYSICAL THERAPY | Facility: CLINIC | Age: 23
End: 2018-05-21
Payer: OTHER MISCELLANEOUS

## 2018-05-21 DIAGNOSIS — M25.561 KNEE PAIN, RIGHT: Primary | ICD-10-CM

## 2018-05-21 PROCEDURE — 97112 NEUROMUSCULAR REEDUCATION: CPT | Mod: GP | Performed by: PHYSICAL THERAPIST

## 2018-05-21 PROCEDURE — 97161 PT EVAL LOW COMPLEX 20 MIN: CPT | Mod: GP | Performed by: PHYSICAL THERAPIST

## 2018-05-21 PROCEDURE — 97110 THERAPEUTIC EXERCISES: CPT | Mod: GP | Performed by: PHYSICAL THERAPIST

## 2018-05-21 RX ORDER — KETOROLAC TROMETHAMINE 10 MG/1
10 TABLET, FILM COATED ORAL DAILY PRN
Qty: 5 TABLET | Refills: 1 | Status: SHIPPED | OUTPATIENT
Start: 2018-05-21 | End: 2018-06-25

## 2018-05-21 ASSESSMENT — ACTIVITIES OF DAILY LIVING (ADL)
KNEE_ACTIVITY_OF_DAILY_LIVING_SCORE: 77.14
LIMPING: I DO NOT HAVE THE SYMPTOM
SIT WITH YOUR KNEE BENT: ACTIVITY IS NOT DIFFICULT
RISE FROM A CHAIR: ACTIVITY IS NOT DIFFICULT
STAND: ACTIVITY IS MINIMALLY DIFFICULT
WALK: ACTIVITY IS MINIMALLY DIFFICULT
STIFFNESS: I HAVE THE SYMPTOM BUT IT DOES NOT AFFECT MY ACTIVITY
RAW_SCORE: 54
PAIN: THE SYMPTOM AFFECTS MY ACTIVITY MODERATELY
GO DOWN STAIRS: ACTIVITY IS MINIMALLY DIFFICULT
AS_A_RESULT_OF_YOUR_KNEE_INJURY,_HOW_WOULD_YOU_RATE_YOUR_CURRENT_LEVEL_OF_DAILY_ACTIVITY?: NEARLY NORMAL
KNEE_ACTIVITY_OF_DAILY_LIVING_SUM: 54
SQUAT: ACTIVITY IS SOMEWHAT DIFFICULT
HOW_WOULD_YOU_RATE_THE_OVERALL_FUNCTION_OF_YOUR_KNEE_DURING_YOUR_USUAL_DAILY_ACTIVITIES?: NEARLY NORMAL
HOW_WOULD_YOU_RATE_THE_CURRENT_FUNCTION_OF_YOUR_KNEE_DURING_YOUR_USUAL_DAILY_ACTIVITIES_ON_A_SCALE_FROM_0_TO_100_WITH_100_BEING_YOUR_LEVEL_OF_KNEE_FUNCTION_PRIOR_TO_YOUR_INJURY_AND_0_BEING_THE_INABILITY_TO_PERFORM_ANY_OF_YOUR_USUAL_DAILY_ACTIVITIES?: 75
GO UP STAIRS: ACTIVITY IS MINIMALLY DIFFICULT
WEAKNESS: THE SYMPTOM AFFECTS MY ACTIVITY SLIGHTLY
KNEEL ON THE FRONT OF YOUR KNEE: ACTIVITY IS MINIMALLY DIFFICULT
GIVING WAY, BUCKLING OR SHIFTING OF KNEE: I DO NOT HAVE THE SYMPTOM
SWELLING: THE SYMPTOM AFFECTS MY ACTIVITY MODERATELY

## 2018-05-21 NOTE — TELEPHONE ENCOUNTER
Discussed pain relief strategies and she will d/c ibuprofen.  --she can use ketoralac 10 mg daily as needed for knee pain.  # 5 rx with 1 refill. Take with food and if not improved f/u.    Doug Tsai MD CAQ

## 2018-05-21 NOTE — PROGRESS NOTES
[facility] Initial Evaluation  Subjective:  Patient is a 23 year old female presenting with rehab right knee hpi. The history is provided by the patient.   Val Jenkins is a 23 year old female with a right knee condition.  Condition occurred with:  Repetition/overuse.  Condition occurred: for unknown reasons.  This is a new condition  Patient began having knee pain on 5/15/18. 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 She went from 5 hours a week up to 5 hours a day at times. .    Patient reports pain:  Lateral and sub patellar.    Pain is described as aching and sharp and is intermittent and reported as 5/10.  Associated symptoms:  Loss of motion/stiffness. Pain is worse in the P.M..  Exacerbated by: Jumping and twisting. and relieved by rest, ice and NSAID's.  Since onset symptoms are gradually improving.  Special tests:  MRI.      General health as reported by patient is excellent.  Pertinent medical history includes:  None.  Medical allergies: no.  Other surgeries include:  None reported.  Current medications:  Pain medication and anti-inflammatory.  Current occupation is Actor/Dancer.  Patient is working in normal job with restrictions.  Primary job tasks include:  Repetitive tasks and prolonged standing (Running and jumping).    Barriers include:  None as reported by the patient.    Red flags:  None as reported by the patient.                        Objective:  Standing Alignment:              Knee:  Genu valgus R and genu valgus L  Ankle/Foot:  Normal    Gait:      Deviations:  Knee:  Knee extension decr R                                                 Hip Evaluation  HIP AROM:  AROM:    Left Hip:     Normal    Right Hip:   Normal                    Hip Strength:    Flexion:   Left: 5-/5   Pain:  Right: 5-/5   Pain:                    Extension:  Left: 5-/5  Pain:Right: 5-/5    Pain:    Abduction:  Left: 5-/5     Pain:Right: 5-/5     Pain:      External Rotation:  Left: 5-/5   Pain:  Right: 5-/5   Pain:                     Knee Evaluation:  ROM:  AROM: normal (Anterior knee pain with end range extension on the R)  Strength:  Normal            Ligament Testing:  Normal                Special Tests: Normal      Palpation:  Palpation of knee: TTP in the lateral infrapatellar fat pad on the R.    Right knee tenderness present at:  Patellar Tendon and Patellar Inferior  Edema:  Edema of the knee: Thickness noted in the the lateral infrapatellar fat pad.    Mobility Testing:  Normal            Functional Testing:        Core Strength:    Single Leg Bridge:  Left:  20/20 reps    Right: 15/20 reps    % of Uninvolved:  75%  Quad:    Single Leg Squat:  Left:       75 drgrees  Normal control and excessive anterior knee excursion  Right:      75 degrees  Normal control and excessive anterior knee excursion  Bilateral Leg Squat:                General     ROS    Assessment/Plan:    Patient is a 23 year old female with right side knee complaints.    Patient has the following significant findings with corresponding treatment plan.                Diagnosis 1:  R knee pain  Pain -  hot/cold therapy, US, electric stimulation, manual therapy, splint/taping/bracing/orthotics, self management, education and home program  Impaired balance - neuro re-education, therapeutic activities and home program  Inflammation - cold therapy, US, electric stimulation and self management/home program  Impaired gait - gait training, assistive devices and home program  Impaired muscle performance - electric stimulation, neuro re-education and home program  Decreased function - therapeutic activities and home program  Decreased Endurance    Therapy Evaluation Codes:   1) History comprised of:   Personal factors that impact the plan of care:      Profession.    Comorbidity factors that impact the plan of care are:      None.     Medications impacting care: Anti-inflammatory and  Pain.  2) Examination of Body Systems comprised of:   Body structures and functions that impact the plan of care:      Hip and Knee.   Activity limitations that impact the plan of care are:      Jumping, Running, Sports, Walking, Working and twisting.  3) Clinical presentation characteristics are:   Stable/Uncomplicated.  4) Decision-Making    Low complexity using standardized patient assessment instrument and/or measureable assessment of functional outcome.  Cumulative Therapy Evaluation is: Low complexity.    Previous and current functional limitations:  (See Goal Flow Sheet for this information)    Short term and Long term goals: (See Goal Flow Sheet for this information)     Communication ability:  Patient appears to be able to clearly communicate and understand verbal and written communication and follow directions correctly.  Treatment Explanation - The following has been discussed with the patient:   RX ordered/plan of care  Anticipated outcomes  Possible risks and side effects  This patient would benefit from PT intervention to resume normal activities.   Rehab potential is excellent.    Frequency:  1 X week, once daily  Duration:  for 4 weeks  Discharge Plan:  Achieve all LTG.  Independent in home treatment program.  Reach maximal therapeutic benefit.    Please refer to the daily flowsheet for treatment today, total treatment time and time spent performing 1:1 timed codes.

## 2018-05-21 NOTE — TELEPHONE ENCOUNTER
[facility] Call Center    Phone Message    May a detailed message be left on voicemail: yes    Reason for Call: Other: Message for Dr Tsai: Pt said her Physical Therapist recommended she call Dr Tsai and ask if she can receive a prescription medication for an Anti Inflammatory please? Something stronger than Ibuprophen that she is taking. Like some kind of a high dosage pack for a week or something. Please return Pt call to discuss. Thanks!     Action Taken: Message routed to:  [facility] Sports Medicine Clinic

## 2018-05-22 PROBLEM — M25.561 KNEE PAIN, RIGHT: Status: ACTIVE | Noted: 2018-05-22

## 2018-06-01 ENCOUNTER — THERAPY VISIT (OUTPATIENT)
Dept: PHYSICAL THERAPY | Facility: CLINIC | Age: 23
End: 2018-06-01
Payer: OTHER MISCELLANEOUS

## 2018-06-01 DIAGNOSIS — M25.561 KNEE PAIN, RIGHT: ICD-10-CM

## 2018-06-01 PROCEDURE — 97110 THERAPEUTIC EXERCISES: CPT | Mod: GP | Performed by: PHYSICAL THERAPIST

## 2018-06-01 PROCEDURE — 97112 NEUROMUSCULAR REEDUCATION: CPT | Mod: GP | Performed by: PHYSICAL THERAPIST

## 2018-06-01 PROCEDURE — 97140 MANUAL THERAPY 1/> REGIONS: CPT | Mod: GP | Performed by: PHYSICAL THERAPIST

## 2018-06-02 ENCOUNTER — OFFICE VISIT (OUTPATIENT)
Dept: ORTHOPEDICS | Facility: CLINIC | Age: 23
End: 2018-06-02
Payer: COMMERCIAL

## 2018-06-02 VITALS
BODY MASS INDEX: 21.34 KG/M2 | DIASTOLIC BLOOD PRESSURE: 71 MMHG | SYSTOLIC BLOOD PRESSURE: 101 MMHG | HEIGHT: 64 IN | WEIGHT: 125 LBS | HEART RATE: 83 BPM

## 2018-06-02 DIAGNOSIS — R29.898 WEAKNESS OF BOTH HIPS: ICD-10-CM

## 2018-06-02 DIAGNOSIS — R20.0 NUMBNESS IN RIGHT LEG: Primary | ICD-10-CM

## 2018-06-02 NOTE — PROGRESS NOTES
"SUBJECTIVE: Val Jenkins is a 23 year old female who had some fat pad impingement, last night with sensation over right leg.  Like it was asleep.  No back issues.  Yesterday noted her back was a little tight.  Herniation about 5 years ago, feels muscular like her back is tight.  Noted feeling of numbness, Feels weird but not painful.  Sore lateral knee but has gotten a lot better.  General feeling throughout the foot.  She can still dance just doesn't feel normal.  Continue PT for another 1-2 weeks  No DVT on Doppler,.    PAST MEDICAL, SOCIAL, SURGICAL AND FAMILY HISTORY: She  has no past medical history on file.  She  has no past surgical history on file.  Her family history is not on file.  She reports that she has never smoked. She has never used smokeless tobacco. She reports that she drinks alcohol. She reports that she does not use illicit drugs.      ALLERGIES: She has No Known Allergies.    CURRENT MEDICATIONS: She has a current medication list which includes the following prescription(s): ibuprofen, norethin-eth estrad triphasic, and ketorolac.     REVIEW OF SYSTEMS: 10 point review of systems is negative except as noted above.    EXAM:  /71  Pulse 83  Ht 1.626 m (5' 4\")  Wt 56.7 kg (125 lb)  LMP 05/15/2018  BMI 21.46 kg/m2  CONSTITUTIONIAL: healthy, alert, no distress and cooperative  HEAD: Normocephalic. No masses, lesions, tenderness or abnormalities  ENT: ENT exam normal, no neck nodes or sinus tenderness  SKIN: no suspicious lesions or rashes  GAIT: normal  Stance: normal  NEUROLOGIC: Normal muscle tone and strength, reflexes normal, sensation grossly normal.  PSYCHIATRIC: affect normal/bright and mentation appears normal.    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 right lateral calf pain- consider L5 irritation, has hx of back issues, mild numbness, mildly perceived sensation difference  2. IT band - foam roller and stretches  3. Fat pad impingement- working with PT and these symptoms are improving  Left note for PT  F/u if not improving, consider lumbar MRI if worsening symptoms    X-RAY INTERPRETATION:   none

## 2018-06-02 NOTE — PROGRESS NOTES
SPORTS & ORTHOPEDIC WALK-IN FOLLOW-UP VISIT 6/2/2018    Interval History:     Follow up reason: Recheck knee    Date of injury: About 3 weeks ago    Date last seen: 5/17/18    Following Therapeutic Plan: Yes     Pain: Improving    Function: Improving    Interval History: Seen by Dr. Tsai a few weeks ago. Knee has been feeling a lot better but last night she started feeling low leg and foot were asleep. Faint but hasn't gone away. Not sure why. Had PT appointment yesterday, new exercises but nothing causing pain. 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 Pain started after rehearsal. Mostly posterior and medial/lateral.     Medical History:    Any recent changes to your medical history? No    Any new medication prescribed since last visit? No    Review of Systems:    Do you have fever, chills, weight loss? No    Do you have any vision problems? No    Do you have any chest pain or edema? No    Do you have any shortness of breath or wheezing?  No    Do you have stomach problems? No    Do you have any numbness or focal weakness? No    Do you have diabetes? No    Do you have problems with bleeding or clotting? No    Do you have an rashes or other skin lesions? No

## 2018-06-02 NOTE — MR AVS SNAPSHOT
After Visit Summary   2018    Val Jenkins    MRN: [id number]           Patient Information     Date Of Birth          1995        Visit Information        Provider Department      2018 1:50 PM Odalys Booth MD [facility]        Today's Diagnoses     Numbness in right leg    -  1    Weakness of both hips           Follow-ups after your visit        Follow-up notes from your care team     Return in about 4 weeks (around 2018), or if symptoms worsen or fail to improve.      Your next 10 appointments already scheduled     2018  9:30 AM CDT   DWIGHT Extremity with Deep Santamaria PT   [facility] ([facility])    [address] [place] [address]  [address]  [place] [address] [phone]              Who to contact     Please call your clinic at [phone] to:    Ask questions about your health    Make or cancel appointments    Discuss your medicines    Learn about your test results    Speak to your doctor            Additional Information About Your Visit        [employer] Information     [employer] is an electronic gateway that provides easy, online access to your medical records. With [employer], you can request a clinic appointment, read your test results, renew a prescription or communicate with your care team.     To sign up for [employer] visit the website at [email or web address]   You will be asked to enter the access code listed below, as well as some personal information. Please follow the directions to create your username and password.     Your access code is: [email or web address]  Expires: 8/15/2018 10:43 AM     Your access code will  in 90 days. If you need help or a new code, please contact your [facility] or call [phone] for assistance.        Care EveryWhere ID     This is your Care EveryWhere ID. This could be used by  "other organizations to access your [place] medical records  [id number]        Your Vitals Were     Pulse Height Last Period BMI (Body Mass Index)          83 1.626 m (5' 4\") 05/15/2018 21.46 kg/m2         Blood Pressure from Last 3 Encounters:   06/02/18 101/71   05/17/18 117/81   05/17/18 117/81    Weight from Last 3 Encounters:   06/02/18 56.7 kg (125 lb)   05/17/18 56.7 kg (125 lb)   05/16/18 56.7 kg (125 lb 1.6 oz)              Today, you had the following     No orders found for display       Primary Care Provider Fax #    Physician No Ref-Primary [phone]       No address on file        Equal Access to Services     WILMER GONZALEZ : Julius Marino, waallyssa palma, qaybta kaalmada jonh, neeraj arreguin . So [facility] [phone].    ATENCIÓN: Si habla español, tiene a reed disposición servicios gratuitos de asistencia lingüística. LlGood [facility] [phone].    We comply with applicable federal civil rights laws and [place] laws. We do not discriminate on the basis of race, color, national origin, age, disability, sex, sexual orientation, or gender identity.            Thank you!     Thank you for choosing [facility] SPORTS AND ORTHOPAEDIC WALK IN CLINIC  for your care. Our goal is always to provide you with excellent care. Hearing back from our patients is one way we can continue to improve our services. Please take a few minutes to complete the written survey that you may receive in the mail after your visit with us. Thank you!             Your Updated Medication List - Protect others around you: Learn how to safely use, store and throw away your medicines at www.disposemymeds.org.          This list is accurate as of 6/2/18  3:57 PM.  Always use your most recent med list.                   Brand Name Dispense Instructions for use Diagnosis    IBUPROFEN PO      Take 400 mg by mouth daily        ketorolac 10 MG tablet    TORADOL    5 tablet    Take 1 tablet (10 mg) by " mouth daily as needed (mod-severe knee pain. take with food)    Patellofemoral pain syndrome of right knee       norethin-eth estrad triphasic 0.5/1/0.5-35 MG-MCG per tablet    PAKO BENSON TRI-TIFFANY

## 2018-06-02 NOTE — LETTER
6/2/2018     RE: Val Jenkins  [address]  [facility] [address]     Dear Colleague,    Thank you for referring your patient, Val Jenkins, to the [facility] SPORTS AND ORTHOPAEDIC WALK IN CLINIC at [facility]. Please see a copy of my visit note below.          SPORTS & ORTHOPEDIC WALK-IN FOLLOW-UP VISIT 6/2/2018    Interval History:     Follow up reason: Recheck knee    Date of injury: About 3 weeks ago    Date last seen: 5/17/18    Following Therapeutic Plan: Yes     Pain: Improving    Function: Improving    Interval History: Seen by Dr. Tsai a few weeks ago. Knee has been feeling a lot better but last night she started feeling low leg and foot were asleep. Faint but hasn't gone away. Not sure why. Had PT appointment yesterday, new exercises but nothing causing pain. 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 Pain started after rehearsal. Mostly posterior and medial/lateral.     Medical History:    Any recent changes to your medical history? No    Any new medication prescribed since last visit? No    Review of Systems:    Do you have fever, chills, weight loss? No    Do you have any vision problems? No    Do you have any chest pain or edema? No    Do you have any shortness of breath or wheezing?  No    Do you have stomach problems? No    Do you have any numbness or focal weakness? No    Do you have diabetes? No    Do you have problems with bleeding or clotting? No    Do you have an rashes or other skin lesions? No             SUBJECTIVE: Val Jenkins is a 23 year old female who had some fat pad impingement, last night with sensation over right leg.  Like it was asleep.  No back issues.  Yesterday noted her back was a little tight.  Herniation about 5 years ago, feels muscular like her back is tight.  Noted feeling of numbness, Feels weird but not painful.  Sore lateral knee but has gotten a lot better.  General  "feeling throughout the foot.  She can still dance just doesn't feel normal.  Continue PT for another 1-2 weeks  No DVT on Doppler,.    PAST MEDICAL, SOCIAL, SURGICAL AND FAMILY HISTORY: She  has no past medical history on file.  She  has no past surgical history on file.  Her family history is not on file.  She reports that she has never smoked. She has never used smokeless tobacco. She reports that she drinks alcohol. She reports that she does not use illicit drugs.      ALLERGIES: She has No Known Allergies.    CURRENT MEDICATIONS: She has a current medication list which includes the following prescription(s): ibuprofen, norethin-eth estrad triphasic, and ketorolac.     REVIEW OF SYSTEMS: 10 point review of systems is negative except as noted above.    EXAM:  /71  Pulse 83  Ht 1.626 m (5' 4\")  Wt 56.7 kg (125 lb)  LMP 05/15/2018  BMI 21.46 kg/m2  CONSTITUTIONIAL: healthy, alert, no distress and cooperative  HEAD: Normocephalic. No masses, lesions, tenderness or abnormalities  ENT: ENT exam normal, no neck nodes or sinus tenderness  SKIN: no suspicious lesions or rashes  GAIT: normal  Stance: normal  NEUROLOGIC: Normal muscle tone and strength, reflexes normal, sensation grossly normal.  PSYCHIATRIC: affect normal/bright and mentation appears normal.    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 right lateral calf pain- consider L5 irritation, has hx of back issues, " mild numbness, mildly perceived sensation difference  2. IT band - foam roller and stretches  3. Fat pad impingement- working with PT and these symptoms are improving  Left note for PT  F/u if not improving, consider lumbar MRI if worsening symptoms    X-RAY INTERPRETATION:   none    Again, thank you for allowing me to participate in the care of your patient.      Sincerely,    Odalys Booth MD

## 2018-06-08 ENCOUNTER — THERAPY VISIT (OUTPATIENT)
Dept: PHYSICAL THERAPY | Facility: CLINIC | Age: 23
End: 2018-06-08
Payer: OTHER MISCELLANEOUS

## 2018-06-08 ENCOUNTER — TELEPHONE (OUTPATIENT)
Dept: ORTHOPEDICS | Facility: CLINIC | Age: 23
End: 2018-06-08

## 2018-06-08 DIAGNOSIS — M25.561 KNEE PAIN, RIGHT: ICD-10-CM

## 2018-06-08 DIAGNOSIS — M25.561 ACUTE PAIN OF RIGHT KNEE: Primary | ICD-10-CM

## 2018-06-08 PROCEDURE — 97112 NEUROMUSCULAR REEDUCATION: CPT | Mod: GP | Performed by: PHYSICAL THERAPIST

## 2018-06-08 PROCEDURE — 97110 THERAPEUTIC EXERCISES: CPT | Mod: GP | Performed by: PHYSICAL THERAPIST

## 2018-06-08 PROCEDURE — 97140 MANUAL THERAPY 1/> REGIONS: CPT | Mod: GP | Performed by: PHYSICAL THERAPIST

## 2018-06-08 RX ORDER — METHYLPREDNISOLONE 4 MG
TABLET, DOSE PACK ORAL
Qty: 21 TABLET | Refills: 0 | Status: SHIPPED | OUTPATIENT
Start: 2018-06-08 | End: 2018-06-25

## 2018-06-08 NOTE — TELEPHONE ENCOUNTER
[facility] Call Center    Phone Message    May a detailed message be left on voicemail: yes    Reason for Call: Other: Pt would like to talk to Dr Tsai regarding her knee pain and that she is having trouble walking     Action Taken: Message routed to:  [facility]

## 2018-06-08 NOTE — TELEPHONE ENCOUNTER
The pt reports that knee has gotten worse the past couple days, and her PT has recommended a repeat antiinflammatory dose pack. The pt is requesting a refill. I told her that Dr. Tsai is out of office for the next week, but I'll forward on her request to another provider and we will get back to her. The pt verbalized understanding.

## 2018-06-08 NOTE — TELEPHONE ENCOUNTER
I printed out a medrol dose jessica, is that what she means?  It printed because she doesn't have a selected pharm.  The script is in my box

## 2018-06-09 ENCOUNTER — OFFICE VISIT (OUTPATIENT)
Dept: ORTHOPEDICS | Facility: CLINIC | Age: 23
End: 2018-06-09
Payer: COMMERCIAL

## 2018-06-09 VITALS
BODY MASS INDEX: 21.34 KG/M2 | HEIGHT: 64 IN | DIASTOLIC BLOOD PRESSURE: 77 MMHG | WEIGHT: 125 LBS | SYSTOLIC BLOOD PRESSURE: 112 MMHG

## 2018-06-09 DIAGNOSIS — M25.561 PATELLOFEMORAL JOINT PAIN, RIGHT: Primary | ICD-10-CM

## 2018-06-09 RX ORDER — DICLOFENAC SODIUM 75 MG/1
75 TABLET, DELAYED RELEASE ORAL 2 TIMES DAILY
Qty: 20 TABLET | Refills: 0 | Status: SHIPPED | OUTPATIENT
Start: 2018-06-09 | End: 2018-06-25

## 2018-06-09 RX ORDER — DICLOFENAC SODIUM 75 MG/1
75 TABLET, DELAYED RELEASE ORAL 2 TIMES DAILY
Qty: 20 TABLET | Refills: 0 | Status: SHIPPED | OUTPATIENT
Start: 2018-06-09 | End: 2018-06-09

## 2018-06-09 NOTE — LETTER
6/9/2018       RE: Val Jenkins  [address]  [facility] [place] [facility] [address]     Dear Colleague,    Thank you for referring your patient, Val Jenkins, to the [facility] SPORTS AND ORTHOPAEDIC WALK IN CLINIC at [facility]. Please see a copy of my visit note below.          SPORTS & ORTHOPEDIC WALK-IN FOLLOW-UP VISIT 6/9/2018    Interval History:     Follow up reason: Right knee/leg     Date of injury: About a month ago     Date last seen: 5/17/18    Following Therapeutic Plan: Yes     Pain: Worsening    Function: Worsening    Interval History: Seen in [facility] by Dr. Tsai and Dr. Booth.  Gradual R knee pain which began a little over a month ago while dancing. Knee pain in lateral. Pain with squatting and kneeling. Sent to PT. Came in again to [facility] with numbness. Considered L5 irritation. To follow up if worsening and get MRI. A few days ago her knee started to flare up again. She has intense pain. She saw her PT who recommended come back in. She was on an NSAID and was told to maybe go on a stronger dose. The numbness has gone away.        DDX: hoffa pad impingement, patellofemoral pain, patellar tendonitis     Medical History:    Any recent changes to your medical history? No    Any new medication prescribed since last visit? No    Review of Systems:    Do you have fever, chills, weight loss? No    Do you have any vision problems? No    Do you have any chest pain or edema? No    Do you have any shortness of breath or wheezing?  No    Do you have stomach problems? No    Do you have any numbness or focal weakness? No    Do you have diabetes? No    Do you have problems with bleeding or clotting? No    Do you have an rashes or other skin lesions? No             ESTABLISHED PATIENT FOLLOW-UP:  RECHECK (Right knee)       HISTORY OF PRESENT ILLNESS  Ms. Jenkins is a pleasant 23 year old year old female who presents to clinic today for follow-up of right knee pain.    Date  "of injury: 4 weeks ago  Date last seen: 5/17/18  Following Therapeutic Plan: Yes  Pain: Worsening  Function: Worsening    Interval History:   Patient has been seen and evaluated by Dr. Tsai and Dr. Booth.  MRI without internal derangement of knee.  Mild medial patellar bony edema.  She is been enrolled in PT addressing patellofemoral pain.  She is completed 3 visits to date. Initially she noted improvement in her pain and more recently this past week she had acute increase in her pain.  Pain up to 7/10 with certain dance routines.  Anterior knee. Worse with squatting, kneeling, dancing.  She is still training for [employer] which opens this Saturday. Dances in high heels.     No swelling. No locking, catching or giving way.     Treatment to date: Icing daily, improved with NSAIDs, taping per PT.    Additional medical/Social/Surgical histories reviewed in [facility] and updated as appropriate.    REVIEW OF SYSTEMS (6/9/2018)  CONSTITUTIONAL: Denies fever and weight loss  GASTROINTESTINAL: Denies abdominal pain, nausea, vomiting  MUSCULOSKELETAL: See HPI  SKIN: Denies any recent rash or lesion  NEUROLOGICAL: Denies numbness or focal weakness     PHYSICAL EXAM  /77  Ht 1.626 m (5' 4\")  Wt 56.7 kg (125 lb)  LMP 05/15/2018  BMI 21.46 kg/m2    General  - normal appearance, in no obvious distress  CV  - normal popliteal pulse  Pulm  - normal respiratory pattern, non-labored  Musculoskeletal - Right knee  - stance: normal gait without limp, no obvious leg length discrepancy, single-leg squat exhibits knee valgus, internal rotation of the hip, contralateral hip drop  - inspection: no swelling or effusion, normal muscle tone, normal bone and joint alignment, no obvious deformity  - palpation: no joint line tenderness, patellar tendon non-tender, tender medial and lateral patellar facet. Very minor discomfort inferior pole of patella/patellar tendon.  - ROM: 135 degrees flexion, -5 degrees extension, not " painful  - strength: 5/5 in flexion, 5/5 in extension  - neuro: no sensory or motor deficit  - special tests:  (-) Lachman  (-) anterior drawer  (-) Baron  (-) Thessaly  (-) varus at 0 and 30 degrees flexion  (-) valgus at 0 and 30 degrees flexion  (+) patellar grind  (-) patellar apprehension  Neuro  - no sensory or motor deficit, grossly normal coordination, normal muscle tone  Skin  - no ecchymosis, erythema, warmth, or induration, no obvious rash  Psych  - interactive, appropriate, normal mood and affect    IMAGING : MRI knee RT 5/17/18  Impression:  1. No evidence of internal derangement.  2. Faint edema signal deep to the iliotibial band distally, query mild  iliotibial band friction syndrome.  3. Focal marrow edema at the peripheral aspect of the medial patella,  nonspecific.     RASHI TANNER    ASSESSMENT & PLAN  Ms. Jenkins is a 23 year old year old female who presents to clinic today for f/u patellofemoral pain of right knee.     -Continue PT, first visit with dance therapist Monday  -Start diclofenac BID x 1 week  -Superfeet orthotic inserts  -Continue taping techniques  -Ice multiple times daily/immediately after rehearsals  -Rest over next 2 days, discontinue high heels this week  -Follow up 4 weeks w/ Dr. Tsai    It was a pleasure seeing Val.      Again, thank you for allowing me to participate in the care of your patient.      Sincerely,    Lisandro Li, DO

## 2018-06-09 NOTE — PROGRESS NOTES
SPORTS & ORTHOPEDIC WALK-IN FOLLOW-UP VISIT 6/9/2018    Interval History:     Follow up reason: Right knee/leg     Date of injury: About a month ago     Date last seen: 5/17/18    Following Therapeutic Plan: Yes     Pain: Worsening    Function: Worsening    Interval History: Seen in [facility] by Dr. Tsai and Dr. Booth.  Gradual R knee pain which began a little over a month ago while dancing. Knee pain in lateral. Pain with squatting and kneeling. Sent to PT. Came in again to [facility] with numbness. Considered L5 irritation. To follow up if worsening and get MRI. A few days ago her knee started to flare up again. She has intense pain. She saw her PT who recommended come back in. She was on an NSAID and was told to maybe go on a stronger dose. The numbness has gone away.        DDX: hoffa pad impingement, patellofemoral pain, patellar tendonitis     Medical History:    Any recent changes to your medical history? No    Any new medication prescribed since last visit? No    Review of Systems:    Do you have fever, chills, weight loss? No    Do you have any vision problems? No    Do you have any chest pain or edema? No    Do you have any shortness of breath or wheezing?  No    Do you have stomach problems? No    Do you have any numbness or focal weakness? No    Do you have diabetes? No    Do you have problems with bleeding or clotting? No    Do you have an rashes or other skin lesions? No

## 2018-06-09 NOTE — PATIENT INSTRUCTIONS
Chondromalacia / Patellofemoral Pain    CHONDROMALACIA PATELLAE:  -Pain in the front of your knee due to increased pressure from the knee cap (patella)  -There are many causes including trauma, history of dislocation or subluxation of knee cap but most often it is due to an imbalance of the thigh muscles or weak core muscles which cause irregular tracking of your kneecap on your thigh bone.  -People whose feet pronate (roll in) increase the outward pulling on the kneecap as well    SIGNS AND SYMPTOMS:  -Diffuse knee pain that worsens with stairs, squatting, prolonged sitting, jumping, and similar movements.  -Pain may be achy or sharp  -Stiffness with prolonged sitting  -Occasionally, giving way of the knee, grinding or a catching sensation    TREATMENT:  -regular exercise (biking, swimming, or elliptical are good for cardio)  -weight lifting/plyometrics are helpful but remember to keep your knees behind your toes (don't bend knee more than 90degrees)  -regular core exercises (yoga and pilates)  -arch supports may help during exercise until hips stronger to prevent ankle pronation  *over the counter semi-rigid brands include power step arch supports may be purchased at specialty shoe stores, [employer] or internet  *custom made orthotics may be ordered by your physician if needed for prolonged treatment  -Stretching and strengthening therapy  -Ice 10-15 minutes after activity. (Ice cups for massage 5-7min)  -Ice and NSAIDs help decrease inflammation. A good anti-inflammatory NSAID medication is Aleve (220mg). Take 1-2 tabs twice daily with food until pain improves or minimum of 14 days, then as needed for pain. (1-2 tabs twice daily dosing is for patients over 12 years old. Edson than 11 yo, check dose with doctor.)  -often component of hip weakness that leads to lower extremity (foot, ankle, shin, and knee) problems so a lot of focus will be on core strength and balance  - recommend yoga for core  strengthening and stretching  -Perform exercises as instructed through handout or formal therapy if doing. Until then start with the following:  -Ankle strengthening  1) balance on one foot 1-2 min daily  2) once able to balance for 1 minute, start hip strengthening with 4 way motion with straight leg. Tie theraband around ankle and balance on other foot while doing15 reps each direction of straight leg  motion  3) arch raises- tighten bottom of foot and hold x 3-5 sec, repeat 5 times  4) ankle exercises (4 way with theraband)- 3 sets of 10-15 (fatigue) daily  -usually takes several weeks before pain free but longer before return to full activity without pain  --Once released to increase activity, BE PATIENT!    Return to activity guidelines include:    If it hurts, please avoid activity    Start gradually and build up, wait 24 hours before increase intensity and time    Runnin min on treadmill (or somewhere you can get home easily from if you have to stop), start walk 4 min/run 1 min and Repeat 3 times. If pain free for 48 hours, increase to walk 3 min/run 2 min. Continue to increase as such as pain allows. If you develop pain, back off to previous pain-free level and wait 1 week before increasing again.    Follow-up in 6 weeks if not improved or sooner if further concern.    If problem flares again after resolved, restart icing, and exercises.      Standing hamstring stretch: Put the heel of the leg on your injured side on a stool about 15 inches high. Keep your leg straight. Lean forward, bending at the hips, until you feel a mild stretch in the back of your thigh. Make sure you don't roll your shoulders or bend at the waist when doing this or you will stretch your lower back instead of your leg. Hold the stretch for 15 to 30 seconds. Repeat 3 times.    Quadriceps stretch: Stand at an arm's length away from the wall with your injured side farthest from the wall. Facing straight ahead, brace yourself by  keeping one hand against the wall. With your other hand, grasp the ankle on your injured side and pull your heel toward your buttocks. Don't arch or twist your back. Keep your knees together. Hold this stretch for 15 to 30 seconds.    Side-lying leg lift: Lie on your uninjured side. Tighten the front thigh muscles on your injured leg and lift that leg 8 to 10 inches (20 to 25 centimeters) away from the other leg. Keep the leg straight and lower it slowly. Do 2 sets of 15.    Quad sets: Sit on the floor with your injured leg straight and your other leg bent. Press the back of the knee of your injured leg against the floor by tightening the muscles on the top of your thigh. Hold this position 10 seconds. Relax. Do 2 sets of 15.  Straight leg raise: Lie on your back with your legs straight out in front of you. Bend the knee on your uninjured side and place the foot flat on the floor. Tighten the thigh muscle on your injured side and lift your leg about 8 inches off the floor. Keep your leg straight and your thigh muscle tight. Slowly lower your leg back down to the floor. Do 2 sets of 15.    Clam exercise: Lie on your uninjured side with your hips and knees bent and feet together. Slowly raise your top leg toward the ceiling while keeping your heels touching each other. Hold for 2 seconds and lower slowly. Do 2 sets of 15 repetitions.    Wall squat with a ball: Stand with your back, shoulders, and head against a wall. Look straight ahead. Keep your shoulders relaxed and your feet 3 feet (90 centimeters) from the wall and shoulder's width apart. Place a soccer or basketball-sized ball behind your back. Keeping your back against the wall, slowly squat down to a 45-degree angle. Your thighs will not yet be parallel to the floor. Hold this position for 10 seconds and then slowly slide back up the wall. Repeat 10 times. Build up to 2 sets of 15.    Knee stabilization: Wrap a piece of elastic tubing around the ankle of your  uninjured leg. Tie a knot in the other end of the tubing and close it in a door at about ankle height.  Stand facing the door on the leg without tubing (your injured leg) and bend your knee slightly, keeping your thigh muscles tight. Stay in this position while you move the leg with the tubing (the uninjured leg) straight back behind you. Do 2 sets of 15.  Turn 90 degrees so the leg without tubing is closest to the door. Move the leg with tubing away from your body. Do 2 sets of 15.  Turn 90 degrees again so your back is to the door. Move the leg with tubing straight out in front of you. Do 2 sets of 15.  Turn your body 90 degrees again so the leg with tubing is closest to the door. Move the leg with tubing across your body. Do 2 sets of 15.  Hold onto a chair if you need help balancing. This exercise can be made more challenging by standing on a firm pillow or foam mat while you move the leg with tubing.    Resisted terminal knee extension: Make a loop with a piece of elastic tubing by tying a knot in both ends. Close the knot in a door at knee height. Step into the loop with your injured leg so the tubing is around the back of your knee. Lift the other foot off the ground and hold onto a chair for balance, if needed. Bend the knee with tubing about 45 degrees. Slowly straighten your leg, keeping your thigh muscle tight as you do this. Repeat 15 times. Do 2 sets of 15. If you need an easier way to do this, stand on both legs for better support while you do the exercise.    Standing calf stretch: Stand facing a wall with your hands on the wall at about eye level. Keep your injured leg back with your heel on the floor. Keep the other leg forward with the knee bent. Turn your back foot slightly inward (as if you were pigeon-toed). Slowly lean into the wall until you feel a stretch in the back of your calf. Hold the stretch for 15 to 30 seconds. Return to the starting position. Repeat 3 times. Do this exercise several  times each day.    Step-up: Stand with the foot of your injured leg on a support 3 to 5 inches (8 to 13 centimeters) high --like a small step or block of wood. Keep your other foot flat on the floor. Shift your weight onto the injured leg on the support. Straighten your injured leg as the other leg comes off the floor. Return to the starting position by bending your injured leg and slowly lowering your uninjured leg back to the floor. Do 2 sets of 15.    Iliotibial band stretch, side-bending: Cross one leg in front of the other leg and lean in the opposite direction from the front leg. Reach the arm on the side of the back leg over your head while you do this. Hold this position for 15 to 30 seconds. Return to the starting position. Repeat 3 times and then switch legs and repeat the exercise.  Developed by [employer].  Published by [employer].  Copyright  2014 [employer] and/or one of its subsidiaries. All rights reserved.

## 2018-06-09 NOTE — PROGRESS NOTES
"ESTABLISHED PATIENT FOLLOW-UP:  RECHECK (Right knee)       HISTORY OF PRESENT ILLNESS  Ms. Jenkins is a pleasant 23 year old year old female who presents to clinic today for follow-up of right knee pain.    Date of injury: 4 weeks ago  Date last seen: 5/17/18  Following Therapeutic Plan: Yes  Pain: Worsening  Function: Worsening    Interval History:   Patient has been seen and evaluated by Dr. Tsai and Dr. Booth.  MRI without internal derangement of knee.  Mild medial patellar bony edema.  She is been enrolled in PT addressing patellofemoral pain.  She is completed 3 visits to date. Initially she noted improvement in her pain and more recently this past week she had acute increase in her pain.  Pain up to 7/10 with certain dance routines.  Anterior knee. Worse with squatting, kneeling, dancing.  She is still training for [employer] which opens this Saturday. Dances in high heels.     No swelling. No locking, catching or giving way.     Treatment to date: Icing daily, improved with NSAIDs, taping per PT.    Additional medical/Social/Surgical histories reviewed in [facility] and updated as appropriate.    REVIEW OF SYSTEMS (6/9/2018)  CONSTITUTIONAL: Denies fever and weight loss  GASTROINTESTINAL: Denies abdominal pain, nausea, vomiting  MUSCULOSKELETAL: See HPI  SKIN: Denies any recent rash or lesion  NEUROLOGICAL: Denies numbness or focal weakness     PHYSICAL EXAM  /77  Ht 1.626 m (5' 4\")  Wt 56.7 kg (125 lb)  LMP 05/15/2018  BMI 21.46 kg/m2    General  - normal appearance, in no obvious distress  CV  - normal popliteal pulse  Pulm  - normal respiratory pattern, non-labored  Musculoskeletal - Right knee  - stance: normal gait without limp, no obvious leg length discrepancy, single-leg squat exhibits knee valgus, internal rotation of the hip, contralateral hip drop  - inspection: no swelling or effusion, normal muscle tone, normal bone and joint alignment, no obvious deformity  - palpation: no joint " line tenderness, patellar tendon non-tender, tender medial and lateral patellar facet. Very minor discomfort inferior pole of patella/patellar tendon.  - ROM: 135 degrees flexion, -5 degrees extension, not painful  - strength: 5/5 in flexion, 5/5 in extension  - neuro: no sensory or motor deficit  - special tests:  (-) Lachman  (-) anterior drawer  (-) Baron  (-) Thessaly  (-) varus at 0 and 30 degrees flexion  (-) valgus at 0 and 30 degrees flexion  (+) patellar grind  (-) patellar apprehension  Neuro  - no sensory or motor deficit, grossly normal coordination, normal muscle tone  Skin  - no ecchymosis, erythema, warmth, or induration, no obvious rash  Psych  - interactive, appropriate, normal mood and affect    IMAGING : MRI knee RT 5/17/18  Impression:  1. No evidence of internal derangement.  2. Faint edema signal deep to the iliotibial band distally, query mild  iliotibial band friction syndrome.  3. Focal marrow edema at the peripheral aspect of the medial patella,  nonspecific.     RASHI TANNER    ASSESSMENT & PLAN  Ms. Jenkins is a 23 year old year old female who presents to clinic today for f/u patellofemoral pain of right knee.     -Continue PT, first visit with dance therapist Monday  -Start diclofenac BID x 1 week  -Superfeet orthotic inserts  -Continue taping techniques  -Ice multiple times daily/immediately after rehearsals  -Rest over next 2 days, discontinue high heels this week  -Follow up 4 weeks w/ Dr. Tsai    It was a pleasure seeing Val.    Lisandro Li DO, [facility]  Primary Care Sports Medicine

## 2018-06-09 NOTE — MR AVS SNAPSHOT
After Visit Summary   6/9/2018    Val Jenkins    MRN: [id number]           Patient Information     Date Of Birth          1995        Visit Information        Provider Department      6/9/2018 9:50 AM Lisandro Li DO [facility]        Care Instructions    Chondromalacia / Patellofemoral Pain    CHONDROMALACIA PATELLAE:  -Pain in the front of your knee due to increased pressure from the knee cap (patella)  -There are many causes including trauma, history of dislocation or subluxation of knee cap but most often it is due to an imbalance of the thigh muscles or weak core muscles which cause irregular tracking of your kneecap on your thigh bone.  -People whose feet pronate (roll in) increase the outward pulling on the kneecap as well    SIGNS AND SYMPTOMS:  -Diffuse knee pain that worsens with stairs, squatting, prolonged sitting, jumping, and similar movements.  -Pain may be achy or sharp  -Stiffness with prolonged sitting  -Occasionally, giving way of the knee, grinding or a catching sensation    TREATMENT:  -regular exercise (biking, swimming, or elliptical are good for cardio)  -weight lifting/plyometrics are helpful but remember to keep your knees behind your toes (don't bend knee more than 90degrees)  -regular core exercises (yoga and pilates)  -arch supports may help during exercise until hips stronger to prevent ankle pronation  *over the counter semi-rigid brands include power step arch supports may be purchased at [employer], [employer] or internet  *custom made orthotics may be ordered by your physician if needed for prolonged treatment  -Stretching and strengthening therapy  -Ice 10-15 minutes after activity. (Ice cups for massage 5-7min)  -Ice and NSAIDs help decrease inflammation. A good anti-inflammatory NSAID medication is Aleve (220mg). Take 1-2 tabs twice daily with food until pain improves or minimum of 14 days, then as  needed for pain. (1-2 tabs twice daily dosing is for patients over 12 years old. Edson than 13 yo, check dose with doctor.)  -often component of hip weakness that leads to lower extremity (foot, ankle, shin, and knee) problems so a lot of focus will be on core strength and balance  - recommend yoga for core strengthening and stretching  -Perform exercises as instructed through handout or formal therapy if doing. Until then start with the following:  -Ankle strengthening  1) balance on one foot 1-2 min daily  2) once able to balance for 1 minute, start hip strengthening with 4 way motion with straight leg. Tie theraband around ankle and balance on other foot while doing15 reps each direction of straight leg  motion  3) arch raises- tighten bottom of foot and hold x 3-5 sec, repeat 5 times  4) ankle exercises (4 way with theraband)- 3 sets of 10-15 (fatigue) daily  -usually takes several weeks before pain free but longer before return to full activity without pain  --Once released to increase activity, BE PATIENT!    Return to activity guidelines include:    If it hurts, please avoid activity    Start gradually and build up, wait 24 hours before increase intensity and time    Runnin min on treadmill (or somewhere you can get home easily from if you have to stop), start walk 4 min/run 1 min and Repeat 3 times. If pain free for 48 hours, increase to walk 3 min/run 2 min. Continue to increase as such as pain allows. If you develop pain, back off to previous pain-free level and wait 1 week before increasing again.    Follow-up in 6 weeks if not improved or sooner if further concern.    If problem flares again after resolved, restart icing, and exercises.      Standing hamstring stretch: Put the heel of the leg on your injured side on a stool about 15 inches high. Keep your leg straight. Lean forward, bending at the hips, until you feel a mild stretch in the back of your thigh. Make sure you don't roll your shoulders  or bend at the waist when doing this or you will stretch your lower back instead of your leg. Hold the stretch for 15 to 30 seconds. Repeat 3 times.    Quadriceps stretch: Stand at an arm's length away from the wall with your injured side farthest from the wall. Facing straight ahead, brace yourself by keeping one hand against the wall. With your other hand, grasp the ankle on your injured side and pull your heel toward your buttocks. Don't arch or twist your back. Keep your knees together. Hold this stretch for 15 to 30 seconds.    Side-lying leg lift: Lie on your uninjured side. Tighten the front thigh muscles on your injured leg and lift that leg 8 to 10 inches (20 to 25 centimeters) away from the other leg. Keep the leg straight and lower it slowly. Do 2 sets of 15.    Quad sets: Sit on the floor with your injured leg straight and your other leg bent. Press the back of the knee of your injured leg against the floor by tightening the muscles on the top of your thigh. Hold this position 10 seconds. Relax. Do 2 sets of 15.  Straight leg raise: Lie on your back with your legs straight out in front of you. Bend the knee on your uninjured side and place the foot flat on the floor. Tighten the thigh muscle on your injured side and lift your leg about 8 inches off the floor. Keep your leg straight and your thigh muscle tight. Slowly lower your leg back down to the floor. Do 2 sets of 15.    Clam exercise: Lie on your uninjured side with your hips and knees bent and feet together. Slowly raise your top leg toward the ceiling while keeping your heels touching each other. Hold for 2 seconds and lower slowly. Do 2 sets of 15 repetitions.    Wall squat with a ball: Stand with your back, shoulders, and head against a wall. Look straight ahead. Keep your shoulders relaxed and your feet 3 feet (90 centimeters) from the wall and shoulder's width apart. Place a soccer or basketball-sized ball behind your back. Keeping your back  against the wall, slowly squat down to a 45-degree angle. Your thighs will not yet be parallel to the floor. Hold this position for 10 seconds and then slowly slide back up the wall. Repeat 10 times. Build up to 2 sets of 15.    Knee stabilization: Wrap a piece of elastic tubing around the ankle of your uninjured leg. Tie a knot in the other end of the tubing and close it in a door at about ankle height.  Stand facing the door on the leg without tubing (your injured leg) and bend your knee slightly, keeping your thigh muscles tight. Stay in this position while you move the leg with the tubing (the uninjured leg) straight back behind you. Do 2 sets of 15.  Turn 90 degrees so the leg without tubing is closest to the door. Move the leg with tubing away from your body. Do 2 sets of 15.  Turn 90 degrees again so your back is to the door. Move the leg with tubing straight out in front of you. Do 2 sets of 15.  Turn your body 90 degrees again so the leg with tubing is closest to the door. Move the leg with tubing across your body. Do 2 sets of 15.  Hold onto a chair if you need help balancing. This exercise can be made more challenging by standing on a firm pillow or foam mat while you move the leg with tubing.    Resisted terminal knee extension: Make a loop with a piece of elastic tubing by tying a knot in both ends. Close the knot in a door at knee height. Step into the loop with your injured leg so the tubing is around the back of your knee. Lift the other foot off the ground and hold onto a chair for balance, if needed. Bend the knee with tubing about 45 degrees. Slowly straighten your leg, keeping your thigh muscle tight as you do this. Repeat 15 times. Do 2 sets of 15. If you need an easier way to do this, stand on both legs for better support while you do the exercise.    Standing calf stretch: Stand facing a wall with your hands on the wall at about eye level. Keep your injured leg back with your heel on the  floor. Keep the other leg forward with the knee bent. Turn your back foot slightly inward (as if you were pigeon-toed). Slowly lean into the wall until you feel a stretch in the back of your calf. Hold the stretch for 15 to 30 seconds. Return to the starting position. Repeat 3 times. Do this exercise several times each day.    Step-up: Stand with the foot of your injured leg on a support 3 to 5 inches (8 to 13 centimeters) high --like a small step or block of wood. Keep your other foot flat on the floor. Shift your weight onto the injured leg on the support. Straighten your injured leg as the other leg comes off the floor. Return to the starting position by bending your injured leg and slowly lowering your uninjured leg back to the floor. Do 2 sets of 15.    Iliotibial band stretch, side-bending: Cross one leg in front of the other leg and lean in the opposite direction from the front leg. Reach the arm on the side of the back leg over your head while you do this. Hold this position for 15 to 30 seconds. Return to the starting position. Repeat 3 times and then switch legs and repeat the exercise.  Developed by [employer].  Published by [employer].  Copyright  2014 [employer] and/or one of its subsidiaries. All rights reserved.                      Follow-ups after your visit        Your next 10 appointments already scheduled     Jun 11, 2018 10:10 AM CDT IAM Dance with Livier Rosas PT   [facility] ([facility]  )    [address] [place] [address]   [phone]            Jun 18, 2018 11:30 AM CDT IAM Dance with Livier Rosas PT   [facility] ([facility]  )    [address] [place] [address]   [phone]            Jun 25, 2018 11:30 AM CDT IAM Dance with Livier Rosas PT   [facility] ([facility]  "[facility]    [address] [facility] [address] [phone]              Who to contact     Please call your clinic at [phone] to:    Ask questions about your health    Make or cancel appointments    Discuss your medicines    Learn about your test results    Speak to your doctor            Additional Information About Your Visit        MyChart Information     [employer] is an electronic gateway that provides easy, online access to your medical records. With magnetic.io, you can request a clinic appointment, read your test results, renew a prescription or communicate with your care team.     To sign up for [employer] visit the website at [email or web address]   You will be asked to enter the access code listed below, as well as some personal information. Please follow the directions to create your username and password.     Your access code is: [email or web address]  Expires: 8/15/2018 10:43 AM     Your access code will  in 90 days. If you need help or a new code, please contact your [facility] or call [phone] for assistance.        Care EveryWhere ID     This is your Care EveryWhere ID. This could be used by other organizations to access your [place] medical records  [id number]        Your Vitals Were     Height Last Period BMI (Body Mass Index)             1.626 m (5' 4\") 05/15/2018 21.46 kg/m2          Blood Pressure from Last 3 Encounters:   18 112/77   18 101/71   18 117/81    Weight from Last 3 Encounters:   18 56.7 kg (125 lb)   18 56.7 kg (125 lb)   18 56.7 kg (125 lb)              Today, you had the following     No orders found for display       Primary Care Provider Fax #    Physician No Ref-Primary [phone]       No address on file        Equal Access to Services     WILMER GONZALEZ : Julius Marino, waallyssa palma, qaybta kaalmada jonh, neeraj maloney. So wa " [phone].    ATENCIÓN: Si sabino campbell, tiene a reed disposición servicios gratuitos de asistencia lingüística. Ace mari [phone].    We comply with applicable federal civil rights laws and [place] laws. We do not discriminate on the basis of race, color, national origin, age, disability, sex, sexual orientation, or gender identity.            Thank you!     Thank you for choosing [facility]  for your care. Our goal is always to provide you with excellent care. Hearing back from our patients is one way we can continue to improve our services. Please take a few minutes to complete the written survey that you may receive in the mail after your visit with us. Thank you!             Your Updated Medication List - Protect others around you: Learn how to safely use, store and throw away your medicines at www.disposemymeds.org.          This list is accurate as of 6/9/18 10:33 AM.  Always use your most recent med list.                   Brand Name Dispense Instructions for use Diagnosis    IBUPROFEN PO      Take 400 mg by mouth daily        ketorolac 10 MG tablet    TORADOL    5 tablet    Take 1 tablet (10 mg) by mouth daily as needed (mod-severe knee pain. take with food)    Patellofemoral pain syndrome of right knee       methylPREDNISolone 4 MG tablet    MEDROL DOSEPAK    21 tablet    Follow package instructions    Acute pain of right knee       norethin-eth estrad triphasic 0.5/1/0.5-35 MG-MCG per tablet    PAKO BENSON TRI-NORINYL

## 2018-06-11 ENCOUNTER — TELEPHONE (OUTPATIENT)
Dept: ORTHOPEDICS | Facility: CLINIC | Age: 23
End: 2018-06-11

## 2018-06-11 ENCOUNTER — THERAPY VISIT (OUTPATIENT)
Dept: PHYSICAL THERAPY | Facility: CLINIC | Age: 23
End: 2018-06-11
Payer: OTHER MISCELLANEOUS

## 2018-06-11 DIAGNOSIS — M25.561 KNEE PAIN, RIGHT: ICD-10-CM

## 2018-06-11 PROCEDURE — 97110 THERAPEUTIC EXERCISES: CPT | Mod: GP | Performed by: PHYSICAL THERAPIST

## 2018-06-11 PROCEDURE — 97140 MANUAL THERAPY 1/> REGIONS: CPT | Mod: GP | Performed by: PHYSICAL THERAPIST

## 2018-06-11 NOTE — TELEPHONE ENCOUNTER
[facility] Call Center    Phone Message    May a detailed message be left on voicemail: no    Reason for Call: Other: Pt calling Juan back, pt will  the Rx at the lock box this morning     Action Taken: Message routed to:  [facility] Sports

## 2018-06-11 NOTE — MR AVS SNAPSHOT
After Visit Summary   6/11/2018    Val Jenkins    MRN: [id number]           Patient Information     Date Of Birth          1995        Visit Information        Provider Department      6/11/2018 10:10 AM Livier Rosas PT [facility]        Today's Diagnoses     Knee pain, right           Follow-ups after your visit        Your next 10 appointments already scheduled     Jose 15, 2018  7:30 AM CDT   DWIGHT Dance with Livier Rosas PT   [facility] ([facility]  )    [address] [place] [address]   [phone]            Jun 18, 2018 11:30 AM CDT   DWIGHT Dance with Livier Rosas PT   [facility] ([facility]  )    [address] [place] [address]   [phone]            Jun 22, 2018 10:10 AM CDT   DWIGHT Dance with Livier Rosas PT   [facility] ([facility]  )    [address] [place] [address]   [phone]            Jun 25, 2018 11:30 AM CDT   DWIGHT Dance with Livier Rosas PT   [facility] ([facility]  )    [address] [place] [address]   [phone]            Jun 29, 2018 10:10 AM CDT   DWIGHT Dance with Livier Rosas PT   [facility] ([facility]  )    [address] [place] [address]   [phone]            Jul 02, 2018 10:10 AM CDT   DWIGHT Dance with Livier Rosas PT   [facility] ([facility]  )    [address] [place] [address]   [phone]            Jul 06, 2018 10:10 AM CDT   DWIGHT Dance with Livier Rosas PT   [facility] ([facility]  )    [address] [place] [address]  [facility] [address] [phone]              Who to contact     If you have questions or need follow up information about today's clinic visit or your schedule please contact [place] [facility] directly at [phone].  Normal or non-critical lab and imaging results will be communicated to you by MyChart, letter or phone within 4 business days after the clinic has received the results. If you do not hear from us within 7 days, please contact the clinic through [employer] or phone. If you have a critical or abnormal lab result, we will notify you by phone as soon as possible.  Submit refill requests through [employer] or call your pharmacy and they will forward the refill request to us. Please allow 3 business days for your refill to be completed.          Additional Information About Your Visit        MyChart Information     [employer] gives you secure access to your electronic health record. If you see a primary care provider, you can also send messages to your care team and make appointments. If you have questions, please call your primary care clinic.  If you do not have a primary care provider, please call [phone] and they will assist you.        Care EveryWhere ID     This is your Care EveryWhere ID. This could be used by other organizations to access your [place] medical records  [id number]        Your Vitals Were     Last Period                   05/15/2018            Blood Pressure from Last 3 Encounters:   06/09/18 112/77   06/02/18 101/71   05/17/18 117/81    Weight from Last 3 Encounters:   06/09/18 56.7 kg (125 lb)   06/02/18 56.7 kg (125 lb)   05/17/18 56.7 kg (125 lb)              We Performed the Following     DWIGHT Progress Notes Report     Manual Ther Tech, 1+Regions, EA 15 min     Therapeutic Exercises        Primary Care Provider Fax #    Physician No Ref-Primary [phone]       No address on file        Equal Access to Services      WILMER GONZALEZ : Hadii aad ku gibson Sokassieali, waaxda luqadaha, qaybta kaalmada adegonzales, neeraj margarita jcbasilia garza micevon arreguin . So [facility] [phone].    ATENCIÓN: Si habla shannan, tiene a reed disposición servicios gratuitos de asistencia lingüística. Smileyame al [phone].    We comply with applicable federal civil rights laws and [place] laws. We do not discriminate on the basis of race, color, national origin, age, disability, sex, sexual orientation, or gender identity.            Thank you!     Thank you for choosing [place] [facility]  for your care. Our goal is always to provide you with excellent care. Hearing back from our patients is one way we can continue to improve our services. Please take a few minutes to complete the written survey that you may receive in the mail after your visit with us. Thank you!             Your Updated Medication List - Protect others around you: Learn how to safely use, store and throw away your medicines at www.disposemymeds.org.          This list is accurate as of 6/11/18 12:56 PM.  Always use your most recent med list.                   Brand Name Dispense Instructions for use Diagnosis    diclofenac 75 MG EC tablet    VOLTAREN    20 tablet    Take 1 tablet (75 mg) by mouth 2 times daily    Patellofemoral joint pain, right       IBUPROFEN PO      Take 400 mg by mouth daily        ketorolac 10 MG tablet    TORADOL    5 tablet    Take 1 tablet (10 mg) by mouth daily as needed (mod-severe knee pain. take with food)    Patellofemoral pain syndrome of right knee       methylPREDNISolone 4 MG tablet    MEDROL DOSEPAK    21 tablet    Follow package instructions    Acute pain of right knee       norethin-eth estrad triphasic 0.5/1/0.5-35 MG-MCG per tablet    PAKO BENSON TRI-NORINYL

## 2018-06-11 NOTE — PROGRESS NOTES
"Subjective:  HPI                    Objective:  System    Physical Exam    General     ROS    Assessment/Plan:    PROGRESS  REPORT    Progress reporting period is from 5/21/18 to 6/11/18.     SUBJECTIVE  Subjective: Tape has been helping. Re-started pain meds (dicolfenac aka Voltaren).  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 Still in significant pain       Initial Pain level: 5/10   Changes in function: Yes, see goal flow sheet for change in function   Adverse reactions: Activity:;   , Adverse reaction activity: exacerbation slowed progress   The subjective and objective information are from the last SOAP note on this patient.    OBJECTIVE  Objective: Pt presents with multiple questions regarding outcomes and recovery. Lengthy discussion with patient regarding pain management while performing. TTP infrapatellar space, non tender patellar tendon. Apprehensive to QS R d/t pain, pain with end range passive knee ext B R > L. Dec R hip ER, dec R hip ITB mobility. 5/5 glute med B.       ASSESSMENT/PLAN  Updated problem list and treatment plan: Diagnosis 1:  R knee fat pad  Pain -  hot/cold therapy, manual therapy, splint/taping/bracing/orthotics, self management, education and home program  Decreased ROM/flexibility - manual therapy, therapeutic exercise and home program  Decreased strength - therapeutic exercise, therapeutic activities and home program  Decreased proprioception - neuro re-education, therapeutic activities and home program  Impaired gait - gait training and home program  STG/LTGs have been met or progress has been made towards goals:  Yes (See Goal flow sheet completed today.)  Assessment of Progress: The patient's condition has potential to improve.  The patient has had set backs in their progress.  Patient is meeting short term goals and is progressing towards long term goals.  Self Management Plans:  Patient has been instructed in a home treatment program.  Patient  has been " instructed in self management of symptoms.  I have re-evaluated this patient and find that the nature, scope, duration and intensity of the therapy is appropriate for the medical condition of the patient.  Val continues to require the following intervention to meet STG and LTG's: PT      Recommendations:  This patient would benefit from continued therapy.     Frequency:  2 X week, once daily  Duration:  for 4 weeks tapering to 1 X a week over 4 weeks        Please refer to the daily flowsheet for treatment today, total treatment time and time spent performing 1:1 timed codes.

## 2018-06-11 NOTE — TELEPHONE ENCOUNTER
Left VM to call back. Another doctor ordered a refill for medrol dosepak. The prescription was printed. Need to know if patient will  or have it sent to home address.

## 2018-06-12 ENCOUNTER — HEALTH MAINTENANCE LETTER (OUTPATIENT)
Age: 23
End: 2018-06-12

## 2018-06-15 ENCOUNTER — THERAPY VISIT (OUTPATIENT)
Dept: PHYSICAL THERAPY | Facility: CLINIC | Age: 23
End: 2018-06-15
Payer: OTHER MISCELLANEOUS

## 2018-06-15 DIAGNOSIS — M25.561 KNEE PAIN, RIGHT: ICD-10-CM

## 2018-06-15 PROCEDURE — 97112 NEUROMUSCULAR REEDUCATION: CPT | Mod: GP | Performed by: PHYSICAL THERAPIST

## 2018-06-15 PROCEDURE — 97140 MANUAL THERAPY 1/> REGIONS: CPT | Mod: GP | Performed by: PHYSICAL THERAPIST

## 2018-06-15 PROCEDURE — 97110 THERAPEUTIC EXERCISES: CPT | Mod: GP | Performed by: PHYSICAL THERAPIST

## 2018-06-18 ENCOUNTER — THERAPY VISIT (OUTPATIENT)
Dept: PHYSICAL THERAPY | Facility: CLINIC | Age: 23
End: 2018-06-18
Payer: OTHER MISCELLANEOUS

## 2018-06-18 DIAGNOSIS — M25.561 KNEE PAIN, RIGHT: ICD-10-CM

## 2018-06-18 PROCEDURE — 97140 MANUAL THERAPY 1/> REGIONS: CPT | Mod: GP | Performed by: PHYSICAL THERAPIST

## 2018-06-18 PROCEDURE — 97112 NEUROMUSCULAR REEDUCATION: CPT | Mod: GP | Performed by: PHYSICAL THERAPIST

## 2018-06-22 ENCOUNTER — THERAPY VISIT (OUTPATIENT)
Dept: PHYSICAL THERAPY | Facility: CLINIC | Age: 23
End: 2018-06-22
Payer: OTHER MISCELLANEOUS

## 2018-06-22 DIAGNOSIS — M25.561 KNEE PAIN, RIGHT: ICD-10-CM

## 2018-06-22 PROCEDURE — 97140 MANUAL THERAPY 1/> REGIONS: CPT | Mod: GP | Performed by: PHYSICAL THERAPIST

## 2018-06-22 PROCEDURE — 97112 NEUROMUSCULAR REEDUCATION: CPT | Mod: GP | Performed by: PHYSICAL THERAPIST

## 2018-06-25 ENCOUNTER — THERAPY VISIT (OUTPATIENT)
Dept: PHYSICAL THERAPY | Facility: CLINIC | Age: 23
End: 2018-06-25
Payer: OTHER MISCELLANEOUS

## 2018-06-25 DIAGNOSIS — M25.569 ARTHRALGIA OF LOWER LEG, UNSPECIFIED LATERALITY: Primary | ICD-10-CM

## 2018-06-25 DIAGNOSIS — M25.561 KNEE PAIN, RIGHT: ICD-10-CM

## 2018-06-25 PROCEDURE — 97110 THERAPEUTIC EXERCISES: CPT | Mod: GP | Performed by: PHYSICAL THERAPIST

## 2018-06-25 PROCEDURE — 97140 MANUAL THERAPY 1/> REGIONS: CPT | Mod: GP | Performed by: PHYSICAL THERAPIST

## 2018-06-25 RX ORDER — DICLOFENAC SODIUM 75 MG/1
75 TABLET, DELAYED RELEASE ORAL 2 TIMES DAILY PRN
Qty: 60 TABLET | Refills: 1 | Status: SHIPPED | OUTPATIENT
Start: 2018-06-25 | End: 2019-05-01

## 2018-06-29 ENCOUNTER — THERAPY VISIT (OUTPATIENT)
Dept: PHYSICAL THERAPY | Facility: CLINIC | Age: 23
End: 2018-06-29
Payer: OTHER MISCELLANEOUS

## 2018-06-29 ENCOUNTER — OFFICE VISIT (OUTPATIENT)
Dept: ORTHOPEDICS | Facility: CLINIC | Age: 23
End: 2018-06-29
Payer: COMMERCIAL

## 2018-06-29 VITALS — HEIGHT: 64 IN | BODY MASS INDEX: 21.34 KG/M2 | WEIGHT: 125 LBS

## 2018-06-29 DIAGNOSIS — M25.561 KNEE PAIN, RIGHT: ICD-10-CM

## 2018-06-29 DIAGNOSIS — M25.562 ACUTE PAIN OF LEFT KNEE: ICD-10-CM

## 2018-06-29 DIAGNOSIS — M76.52 PATELLAR TENDINITIS OF LEFT KNEE: Primary | ICD-10-CM

## 2018-06-29 PROCEDURE — 97140 MANUAL THERAPY 1/> REGIONS: CPT | Mod: GP | Performed by: PHYSICAL THERAPIST

## 2018-06-29 PROCEDURE — 97110 THERAPEUTIC EXERCISES: CPT | Mod: GP | Performed by: PHYSICAL THERAPIST

## 2018-06-29 PROCEDURE — 97112 NEUROMUSCULAR REEDUCATION: CPT | Mod: GP | Performed by: PHYSICAL THERAPIST

## 2018-06-29 NOTE — PROGRESS NOTES
"      SPORTS & ORTHOPEDIC WALK-IN VISIT 6/29/2018    Primary Care Physician:      Left knee pain since Monday. While extending her knee she felt stiffness. She feels that something is \"off\" of \"weird\" with her patella. When she does have pain she has it over her quad tendon.     Reason for visit:     What part of your body is injured / painful?  left knee    What caused the injury /pain? Unsure    How long ago did your injury occur or pain begin? Monday     What are your most bothersome symptoms? Pain    How would you characterize your symptom?  aching    What makes your symptoms better? Rest, Heat and Ibuprofen    What makes your symptoms worse? Standing, Walking and Other: dancing     Have you been previously seen for this problem? No    Medical History:    Any recent changes to your medical history? No    Any new medication prescribed since last visit? No    Have you had surgery on this body part before? 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 No    Do you have any vision problems? No    Do you have any chest pain or edema? No    Do you have any shortness of breath or wheezing?  No    Do you have stomach problems? No    Do you have any numbness or focal weakness? No    Do you have diabetes? No    Do you have problems with bleeding or clotting? No    Do you have an rashes or other skin lesions? No         "

## 2018-06-29 NOTE — PATIENT INSTRUCTIONS
Patellar Tendonitis (Jumper's Knee)    WHAT IS JUMPER S KNEE?    Jumper s knee is a problem with the tendon that connects your kneecap to your shinbone. Tendons are strong bands of tissue that connect muscle to bone. They can be injured suddenly or they may be slowly damaged over time. You can have tiny or partial tears in your tendon. If you have a complete tear of your tendon, it is called a rupture. Other tendon injuries may be called a strain, tendinosis, or tendonitis. Jumper's knee is also called a patellar tendon injury, or patellar tendinopathy.    WHAT IS THE CAUSE?    Jumper's knee can be caused by:    Overuse of the tendon from a sport or work activity that involves your knees, such as too much jumping, running, walking, or bicycling  A sudden activity that twists or tears your tendon, such as a fall or an accident  Jumper s knee can also happen if your hips, legs, knees, or feet are not aligned properly. People whose hips are wide, who are knock-kneed, or who have feet with arches that collapse when they walk or run can have this problem.    WHAT ARE THE SYMPTOMS?    Symptoms may include:    Pain and tenderness around your knee and behind your kneecap  Pain when you bend or straighten your leg  Pain when you jump, run, or walk, especially downhill or down stairs  Swelling in your knee joint or where your tendon attaches to your shinbone  If your tendon is torn, usually you will have sudden severe pain and you will not be able to straighten your leg or walk.    HOW IS IT DIAGNOSED?    Your healthcare provider will examine you and ask about your symptoms, activities, and medical history. You may have X-rays or other scans.    HOW IS IT TREATED?    You will need to change or stop doing the activities that cause pain until the tendon has healed. For example, swim instead of run.    You may need to wear a special strap that goes over your patellar tendon or a knee brace that helps support and protect your  knee while your tendon heals. Special shoes or shoe inserts may also help.    Your healthcare provider may recommend stretching and strengthening exercises to help you heal.    If your tendon is torn, you may need surgery to repair the tendon.    The pain often gets better within a few weeks with self-care, but some injuries may take several months or longer to heal. It s important to follow all of your healthcare provider s instructions.    HOW CAN I TAKE CARE OF MYSELF?    To help the swelling and pain:    Put an ice pack, gel pack, or package of frozen vegetables wrapped in a cloth, on the area every 3 to 4 hours for up to 20 minutes at a time.  Keep your knee up on a pillow when you sit or lie down.  Take pain medicine, such as acetaminophen, ibuprofen, or other medicine as directed by your provider. Nonsteroidal anti-inflammatory medicines (NSAIDs), such as ibuprofen, may cause stomach bleeding and other problems. These risks increase with age. Read the label and take as directed. Unless recommended by your healthcare provider, do not take for more than 10 days.  Follow your healthcare provider's instructions, including any exercises recommended by your provider. Ask your provider:    How and when you will hear your test results  How long it will take to recover  What activities you should avoid, including how much you can lift, and when you can return to your normal activities  How to take care of yourself at home  What symptoms or problems you should watch for and what to do if you have them  Make sure you know when you should come back for a checkup.    HOW CAN I HELP PREVENT JUMPER S KNEE?    Warm-up exercises and stretching before activities can help prevent injuries. For example, do stretches and exercises that strengthen your thigh muscles. If your knee hurts after exercise, putting ice on it may help keep it from getting injured.    Follow the safety rules for your work or sport and use protective  equipment, like wearing the right type of shoes for your activities.    Developed by [employer].  Published by [employer].  Copyright  2014 [employer] and/or one of its subsidiaries. All rights reserved.    References    Patellar Tendonitis Exercises    You can do the first 4 exercises right away. When you have less pain in your knee, you can do the remaining exercises.    Standing hamstring stretch: Put the heel of the leg on your injured side on a stool about 15 inches high. Keep your leg straight. Lean forward, bending at the hips, until you feel a mild stretch in the back of your thigh. Make sure you don't roll your shoulders or bend at the waist when doing this or you will stretch your lower back instead of your leg. Hold the stretch for 15 to 30 seconds. Repeat 3 times.    Quadriceps stretch: Stand at an arm's length away from the wall with your injured side farthest from the wall. Facing straight ahead, brace yourself by keeping one hand against the wall. With your other hand, grasp the ankle on your injured side and pull your heel toward your buttocks. Don't arch or twist your back. Keep your knees together. Hold this stretch for 15 to 30 seconds.    Side-lying leg lift: Lie on your uninjured side. Tighten the front thigh muscles on your injured leg and lift that leg 8 to 10 inches (20 to 25 centimeters) away from the other leg. Keep the leg straight and lower it slowly. Do 2 sets of 15.  Rectus femoris stretch: Kneel on your injured knee on a padded surface. Place your other leg in front of you with your foot flat on the floor. Keep your head and chest facing forward and upright and grab the ankle behind you. Gently bring your ankle back toward your buttocks until you feel a stretch in the front of your thigh. Hold 15 to 30 seconds. Repeat 2 to 3 times.    Straight leg raise: Lie on your back with your legs straight out in front of you. Bend the knee on your uninjured side and place the foot  flat on the floor. Tighten the thigh muscle on your injured side and lift your leg about 8 inches off the floor. Keep your leg straight and your thigh muscle tight. Slowly lower your leg back down to the floor. Do 2 sets of 15.    Prone hip extension: Lie on your stomach with your legs straight out behind you. Fold your arms under your head and rest your head on your arms. Draw your belly button in towards your spine and tighten your abdominal muscles. Tighten the buttocks and thigh muscles of the leg on your injured side and lift the leg off the floor about 8 inches. Keep your leg straight. Hold for 5 seconds. Then lower your leg and relax. Do 2 sets of 15.    Clam exercise: Lie on your uninjured side with your hips and knees bent and feet together. Slowly raise your top leg toward the ceiling while keeping your heels touching each other. Hold for 2 seconds and lower slowly. Do 2 sets of 15 repetitions.    Step-up: Stand with the foot of your injured leg on a support 3 to 5 inches (8 to 13 centimeters) high --like a small step or block of wood. Keep your other foot flat on the floor. Shift your weight onto the injured leg on the support. Straighten your injured leg as the other leg comes off the floor. Return to the starting position by bending your injured leg and slowly lowering your uninjured leg back to the floor. Do 2 sets of 15.    Wall squat with a ball: Stand with your back, shoulders, and head against a wall. Look straight ahead. Keep your shoulders relaxed and your feet 3 feet (90 centimeters) from the wall and shoulder's width apart. Place a soccer or basketball-sized ball behind your back. Keeping your back against the wall, slowly squat down to a 45-degree angle. Your thighs will not yet be parallel to the floor. Hold this position for 10 seconds and then slowly slide back up the wall. Repeat 10 times. Build up to 2 sets of 15.    Knee stabilization: Wrap a piece of elastic tubing around the ankle of  your uninjured leg. Tie a knot in the other end of the tubing and close it in a door at about ankle height.    Stand facing the door on the leg without tubing (your injured leg) and bend your knee slightly, keeping your thigh muscles tight. Stay in this position while you move the leg with the tubing (the uninjured leg) straight back behind you. Do 2 sets of 15.  Turn 90 degrees so the leg without tubing is closest to the door. Move the leg with tubing away from your body. Do 2 sets of 15.  Turn 90 degrees again so your back is to the door. Move the leg with tubing straight out in front of you. Do 2 sets of 15.  Turn your body 90 degrees again so the leg with tubing is closest to the door. Move the leg with tubing across your body. Do 2 sets of 15.  Hold onto a chair if you need help balancing. This exercise can be made more challenging by standing on a firm pillow or foam mat while you move the leg with tubing.    Resisted terminal knee extension: Make a loop with a piece of elastic tubing by tying a knot in both ends. Close the knot in a door at knee height. Step into the loop with your injured leg so the tubing is around the back of your knee. Lift the other foot off the ground and hold onto a chair for balance, if needed. Bend the knee with tubing about 45 degrees. Slowly straighten your leg, keeping your thigh muscle tight as you do this. Repeat 15 times. Do 2 sets of 15. If you need an easier way to do this, stand on both legs for better support while you do the exercise.  Decline single-leg squat: Stand with both feet on an angled platform or with your heels on a board about 3 inches (8 centimeters) high. Put all of your weight on your injured leg and squat down to a 45-degree angle. Use your other leg to help you return to a standing position from the squat. You should lower your body to a squat using only your injured leg but you can use both legs to return to standing. When this exercise gets easy, hold  weights in your hands to make the exercise more difficult. Do 2 sets of 15.    Developed by [employer].  Published by [employer].  Copyright  2014 [employer] and/or one of its subsidiaries. All rights reserved.    References

## 2018-06-29 NOTE — LETTER
"6/29/2018       RE: Val Jenkins  [address]  [facility] [address]     Dear Colleague,    Thank you for referring your patient, Val Jenkins, to the [facility] SPORTS AND ORTHOPAEDIC WALK IN CLINIC at [facility]. Please see a copy of my visit note below.          SPORTS & ORTHOPEDIC WALK-IN VISIT 6/29/2018    Primary Care Physician:      Left knee pain since Monday. While extending her knee she felt stiffness. She feels that something is \"off\" of \"weird\" with her patella. When she does have pain she has it over her quad tendon.     Reason for visit:     What part of your body is injured / painful?  left knee    What caused the injury /pain? Unsure    How long ago did your injury occur or pain begin? Monday     What are your most bothersome symptoms? Pain    How would you characterize your symptom?  aching    What makes your symptoms better? Rest, Heat and Ibuprofen    What makes your symptoms worse? Standing, Walking and Other: dancing     Have you been previously seen for this problem? No    Medical History:    Any recent changes to your medical history? No    Any new medication prescribed since last visit? No    Have you had surgery on this body part before? 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 No    Do you have any vision problems? No    Do you have any chest pain or edema? No    Do you have any shortness of breath or wheezing?  No    Do you have stomach problems? No    Do you have any numbness or focal weakness? No    Do you have diabetes? No    Do you have problems with bleeding or clotting? No    Do you have an rashes or other skin lesions? No           CHIEF COMPLAINT:  Pain of the Left Knee and New Patient       HISTORY OF PRESENT ILLNESS  Ms. Jenkins is a pleasant 23 year old year old female who presents to " "clinic today with new left knee pain.  Val explains that knee pain began 4 days ago.  She describes pain superior and inferior to her patella.  When she extends her knee she feels \"stiffness\" and that \"something is off \".  Worse wearing heels, which she has to wear during her performance as a part of [employer] story.  She has tried rest, heat and diclofenac.  She is also seeing physical therapy twice weekly for her right knee pain.    No locking, catching or giving way.  She is currently involved in an intense theater/dance performance at the [employer].  She is performing 4 days a week, multiple hours per day.    In regard to her right knee, she has been seeing some improvement.  She continues physical therapy, using diclofenac.  She has been icing religiously after performances.  Additional history: as documented    Additional medical/Social/Surgical histories reviewed in [facility] and updated as appropriate.    REVIEW OF SYSTEMS (6/30/2018)  CONSTITUTIONAL: Denies fever and weight loss  GASTROINTESTINAL: Denies abdominal pain, nausea, vomiting  MUSCULOSKELETAL: See HPI  SKIN: Denies any recent rash or lesion  NEUROLOGICAL: Denies numbness or focal weakness     PHYSICAL EXAM  Ht 1.626 m (5' 4\")  Wt 56.7 kg (125 lb)  BMI 21.46 kg/m2    General  - normal appearance, in no obvious distress  CV  - normal popliteal pulse  Pulm  - normal respiratory pattern, non-labored  Musculoskeletal - Left knee  - stance: normal gait without limp, discomfort with single leg squat, no obvious leg length discrepancy  - inspection: no swelling or effusion, normal muscle tone, normal bone and joint alignment, no obvious deformity  - palpation: no joint line tenderness, normal popliteal pulse, tender over distal quadriceps tendon and proximal and midsubstance patellar tendon.  No patellar facet tenderness.  - ROM: 135 degrees flexion, -5 degrees extension, painful passive flexion terminally  - strength: 5/5 in flexion, 5/5 in " extension  - neuro: no sensory or motor deficit  - special tests:  (-) Lachman  (-) anterior drawer  (-) posterior drawer  (-) Baron  (-) varus at 0 and 30 degrees flexion  (-) valgus at 0 and 30 degrees flexion  (-) Emory s compression test  Neuro  - no sensory or motor deficit, grossly normal coordination, normal muscle tone  Skin  - no ecchymosis, erythema, warmth, or induration, no obvious rash  Psych  - interactive, appropriate, normal mood and affect    US used to evaluate quadriceps and patellar tendon.  There is no evidence LTD IN-OFFICE: High frequency doppler transducer patellar or quadriceps tendon tearing.  Mild tendinosis of patellar tendon seen.  No effusion of the knee.     ASSESSMENT & PLAN  Ms. Jenkins is a 23 year old year old female who presents to clinic today with new onset of left anterior knee pain.  Unfortunately most of her pain is likely related to overuse injury.  She is currently a part of a very successful theater production of [employer], dancing in high heels, and is unable to take more than a day of rest.     Diagnosis: Patellar tendonitis, left knee. Quadriceps tendonitis, left.    -Ice to anterior bilateral knees  -PT to address PT, QT on left knee, taping left knee as well as right  -Diclo-Max compounding cream up to QID  -HEP  -Rest when able; avoid high heels during rehearsals if able.  -Follow up 4 weeks.    It was a pleasure seeing Val.    Lisandro Li DO, [facility]  Primary Care Sports Medicine

## 2018-06-29 NOTE — MR AVS SNAPSHOT
After Visit Summary   6/29/2018    Val Jenkins    MRN: [id number]           Patient Information     Date Of Birth          1995        Visit Information        Provider Department      6/29/2018 2:10 PM Lisandro Li DO [facility]        Care Instructions    Patellar Tendonitis (Jumper's Knee)    WHAT IS JUMPER S KNEE?    Jumper s knee is a problem with the tendon that connects your kneecap to your shinbone. Tendons are strong bands of tissue that connect muscle to bone. They can be injured suddenly or they may be slowly damaged over time. You can have tiny or partial tears in your tendon. If you have a complete tear of your tendon, it is called a rupture. Other tendon injuries may be called a strain, tendinosis, or tendonitis. Jumper's knee is also called a patellar tendon injury, or patellar tendinopathy.    WHAT IS THE CAUSE?    Jumper's knee can be caused by:    Overuse of the tendon from a sport or work activity that involves your knees, such as too much jumping, running, walking, or bicycling  A sudden activity that twists or tears your tendon, such as a fall or an accident  Jumper s knee can also happen if your hips, legs, knees, or feet are not aligned properly. People whose hips are wide, who are knock-kneed, or who have feet with arches that collapse when they walk or run can have this problem.    WHAT ARE THE SYMPTOMS?    Symptoms may include:    Pain and tenderness around your knee and behind your kneecap  Pain when you bend or straighten your leg  Pain when you jump, run, or walk, especially downhill or down stairs  Swelling in your knee joint or where your tendon attaches to your shinbone  If your tendon is torn, usually you will have sudden severe pain and you will not be able to straighten your leg or walk.    HOW IS IT DIAGNOSED?    Your healthcare provider will examine you and ask about your symptoms, activities, and medical history. You  may have X-rays or other scans.    HOW IS IT TREATED?    You will need to change or stop doing the activities that cause pain until the tendon has healed. For example, swim instead of run.    You may need to wear a special strap that goes over your patellar tendon or a knee brace that helps support and protect your knee while your tendon heals. Special shoes or shoe inserts may also help.    Your healthcare provider may recommend stretching and strengthening exercises to help you heal.    If your tendon is torn, you may need surgery to repair the tendon.    The pain often gets better within a few weeks with self-care, but some injuries may take several months or longer to heal. It s important to follow all of your healthcare provider s instructions.    HOW CAN I TAKE CARE OF MYSELF?    To help the swelling and pain:    Put an ice pack, gel pack, or package of frozen vegetables wrapped in a cloth, on the area every 3 to 4 hours for up to 20 minutes at a time.  Keep your knee up on a pillow when you sit or lie down.  Take pain medicine, such as acetaminophen, ibuprofen, or other medicine as directed by your provider. Nonsteroidal anti-inflammatory medicines (NSAIDs), such as ibuprofen, may cause stomach bleeding and other problems. These risks increase with age. Read the label and take as directed. Unless recommended by your healthcare provider, do not take for more than 10 days.  Follow your healthcare provider's instructions, including any exercises recommended by your provider. Ask your provider:    How and when you will hear your test results  How long it will take to recover  What activities you should avoid, including how much you can lift, and when you can return to your normal activities  How to take care of yourself at home  What symptoms or problems you should watch for and what to do if you have them  Make sure you know when you should come back for a checkup.    HOW CAN I HELP PREVENT JUMPER S  KNEE?    Warm-up exercises and stretching before activities can help prevent injuries. For example, do stretches and exercises that strengthen your thigh muscles. If your knee hurts after exercise, putting ice on it may help keep it from getting injured.    Follow the safety rules for your work or sport and use protective equipment, like wearing the right type of shoes for your activities.    Developed by [employer].  Published by [employer].  Copyright  2014 [employer] and/or one of its subsidiaries. All rights reserved.    References    Patellar Tendonitis Exercises    You can do the first 4 exercises right away. When you have less pain in your knee, you can do the remaining exercises.    Standing hamstring stretch: Put the heel of the leg on your injured side on a stool about 15 inches high. Keep your leg straight. Lean forward, bending at the hips, until you feel a mild stretch in the back of your thigh. Make sure you don't roll your shoulders or bend at the waist when doing this or you will stretch your lower back instead of your leg. Hold the stretch for 15 to 30 seconds. Repeat 3 times.    Quadriceps stretch: Stand at an arm's length away from the wall with your injured side farthest from the wall. Facing straight ahead, brace yourself by keeping one hand against the wall. With your other hand, grasp the ankle on your injured side and pull your heel toward your buttocks. Don't arch or twist your back. Keep your knees together. Hold this stretch for 15 to 30 seconds.    Side-lying leg lift: Lie on your uninjured side. Tighten the front thigh muscles on your injured leg and lift that leg 8 to 10 inches (20 to 25 centimeters) away from the other leg. Keep the leg straight and lower it slowly. Do 2 sets of 15.  Rectus femoris stretch: Kneel on your injured knee on a padded surface. Place your other leg in front of you with your foot flat on the floor. Keep your head and chest facing forward and  upright and grab the ankle behind you. Gently bring your ankle back toward your buttocks until you feel a stretch in the front of your thigh. Hold 15 to 30 seconds. Repeat 2 to 3 times.    Straight leg raise: Lie on your back with your legs straight out in front of you. Bend the knee on your uninjured side and place the foot flat on the floor. Tighten the thigh muscle on your injured side and lift your leg about 8 inches off the floor. Keep your leg straight and your thigh muscle tight. Slowly lower your leg back down to the floor. Do 2 sets of 15.    Prone hip extension: Lie on your stomach with your legs straight out behind you. Fold your arms under your head and rest your head on your arms. Draw your belly button in towards your spine and tighten your abdominal muscles. Tighten the buttocks and thigh muscles of the leg on your injured side and lift the leg off the floor about 8 inches. Keep your leg straight. Hold for 5 seconds. Then lower your leg and relax. Do 2 sets of 15.    Clam exercise: Lie on your uninjured side with your hips and knees bent and feet together. Slowly raise your top leg toward the ceiling while keeping your heels touching each other. Hold for 2 seconds and lower slowly. Do 2 sets of 15 repetitions.    Step-up: Stand with the foot of your injured leg on a support 3 to 5 inches (8 to 13 centimeters) high --like a small step or block of wood. Keep your other foot flat on the floor. Shift your weight onto the injured leg on the support. Straighten your injured leg as the other leg comes off the floor. Return to the starting position by bending your injured leg and slowly lowering your uninjured leg back to the floor. Do 2 sets of 15.    Wall squat with a ball: Stand with your back, shoulders, and head against a wall. Look straight ahead. Keep your shoulders relaxed and your feet 3 feet (90 centimeters) from the wall and shoulder's width apart. Place a soccer or basketball-sized ball behind  your back. Keeping your back against the wall, slowly squat down to a 45-degree angle. Your thighs will not yet be parallel to the floor. Hold this position for 10 seconds and then slowly slide back up the wall. Repeat 10 times. Build up to 2 sets of 15.    Knee stabilization: Wrap a piece of elastic tubing around the ankle of your uninjured leg. Tie a knot in the other end of the tubing and close it in a door at about ankle height.    Stand facing the door on the leg without tubing (your injured leg) and bend your knee slightly, keeping your thigh muscles tight. Stay in this position while you move the leg with the tubing (the uninjured leg) straight back behind you. Do 2 sets of 15.  Turn 90 degrees so the leg without tubing is closest to the door. Move the leg with tubing away from your body. Do 2 sets of 15.  Turn 90 degrees again so your back is to the door. Move the leg with tubing straight out in front of you. Do 2 sets of 15.  Turn your body 90 degrees again so the leg with tubing is closest to the door. Move the leg with tubing across your body. Do 2 sets of 15.  Hold onto a chair if you need help balancing. This exercise can be made more challenging by standing on a firm pillow or foam mat while you move the leg with tubing.    Resisted terminal knee extension: Make a loop with a piece of elastic tubing by tying a knot in both ends. Close the knot in a door at knee height. Step into the loop with your injured leg so the tubing is around the back of your knee. Lift the other foot off the ground and hold onto a chair for balance, if needed. Bend the knee with tubing about 45 degrees. Slowly straighten your leg, keeping your thigh muscle tight as you do this. Repeat 15 times. Do 2 sets of 15. If you need an easier way to do this, stand on both legs for better support while you do the exercise.  Decline single-leg squat: Stand with both feet on an angled platform or with your heels on a board about 3 inches (8  centimeters) high. Put all of your weight on your injured leg and squat down to a 45-degree angle. Use your other leg to help you return to a standing position from the squat. You should lower your body to a squat using only your injured leg but you can use both legs to return to standing. When this exercise gets easy, hold weights in your hands to make the exercise more difficult. Do 2 sets of 15.    Developed by [employer].  Published by [employer].  Copyright  2014 [employer] and/or one of its subsidiaries. All rights reserved.    References                        Follow-ups after your visit        Your next 10 appointments already scheduled     Jul 02, 2018 10:10 AM CDT   DWIGHT Dance with Livier Rosas PT   [facility] ([facility]  )    [address] [place], [place] [id number]   [phone]            Jul 06, 2018 10:10 AM CDT   DWIGHT Danherman with Livier Rosas PT   [facility] ([facility]  )    [address] [place], [place] [id number]   [phone]            Jul 09, 2018  9:30 AM CDT   DWIGHT Danherman with Livier Rosas PT   [facility] ([facility]  )    [address] [place], [place] [id number]   [phone]            Jul 13, 2018  9:30 AM CDT   DWIGHT Danherman with Livier Rosas PT   [facility] ([facility]  )    [address] [place] [address]   [phone]            Jul 16, 2018 11:30 AM CDT   DWIGHT Dance with Livier Rosas PT   [facility]  )    [address] [place] [address]   [phone]            Jul 20, 2018 11:30 AM CDT   DWIGHT Danherman with Livier Rosas PT   [facility] ([facility]  )    [address] [place] [address]  "[facility] [address]   [phone]            Jul 23, 2018 12:10 PM CDT   IAM Dance with Livier Rosas PT   [facility] ([facility]  )    [address] [address]   [phone]            Jul 27, 2018 11:30 AM CDT   IAM Dance with Livier Rosas PT   [facility] ([facility]  )    [address] [address]   [phone]              Who to contact     Please call your clinic at [phone] to:    Ask questions about your health    Make or cancel appointments    Discuss your medicines    Learn about your test results    Speak to your doctor            Additional Information About Your Visit        [employer] Information     [employer] gives you secure access to your electronic health record. If you see a primary care provider, you can also send messages to your care team and make appointments. If you have questions, please call your primary care clinic.  If you do not have a primary care provider, please call [phone] and they will assist you.      [employer] is an electronic gateway that provides easy, online access to your medical records. With [employer], you can request a clinic appointment, read your test results, renew a prescription or communicate with your care team.     To access your existing account, please contact your [facility] or call [phone] for assistance.        Care EveryWhere ID     This is your Care EveryWhere ID. This could be used by other organizations to access your [place] medical records  [id number]        Your Vitals Were     Height BMI (Body Mass Index)                1.626 m (5' 4\") 21.46 kg/m2           Blood Pressure from Last 3 Encounters:   06/09/18 112/77   06/02/18 101/71   05/17/18 117/81    Weight from Last 3 Encounters:   06/29/18 56.7 kg (125 lb)   06/09/18 56.7 kg (125 lb) "   06/02/18 56.7 kg (125 lb)              Today, you had the following     No orders found for display       Primary Care Provider Fax #    Physician No Ref-Primary [phone]       No address on file        Equal Access to Services     WILMER STEINERSHELLY : Julius baum elvis gibson Marino, waalexda luqadaha, qaybta kaalmada jonh, neeraj penningtonbasilia . So [facility] [phone].    ATENCIÓN: Si habla español, tiene a reed disposición servicios gratuitos de asistencia lingüística. Llame al [phone].    We comply with applicable federal civil rights laws and [place] laws. We do not discriminate on the basis of race, color, national origin, age, disability, sex, sexual orientation, or gender identity.            Thank you!     Thank you for choosing [facility]  for your care. Our goal is always to provide you with excellent care. Hearing back from our patients is one way we can continue to improve our services. Please take a few minutes to complete the written survey that you may receive in the mail after your visit with us. Thank you!             Your Updated Medication List - Protect others around you: Learn how to safely use, store and throw away your medicines at www.disposemymeds.org.          This list is accurate as of 6/29/18  3:06 PM.  Always use your most recent med list.                   Brand Name Dispense Instructions for use Diagnosis    diclofenac 75 MG EC tablet    VOLTAREN    60 tablet    Take 1 tablet (75 mg) by mouth 2 times daily as needed for moderate pain    Arthralgia of lower leg, unspecified laterality       norethin-eth estrad triphasic 0.5/1/0.5-35 MG-MCG per tablet    PAKO BENSON TRI-NORINYL

## 2018-06-30 NOTE — PROGRESS NOTES
"CHIEF COMPLAINT:  Pain of the Left Knee and New Patient       HISTORY OF PRESENT ILLNESS  Ms. Jenkins is a pleasant 23 year old year old female who presents to clinic today with new left knee pain.  Val explains that knee pain began 4 days ago.  She describes pain superior and inferior to her patella.  When she extends her knee she feels \"stiffness\" and that \"something is off \".  Worse wearing heels, which she has to wear during her performance as a part of [employer] story.  She has tried rest, heat and diclofenac.  She is also seeing physical therapy twice weekly for her right knee pain.    No locking, catching or giving way.  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  She is performing 4 days a week, multiple hours per day.    In regard to her right knee, she has been seeing some improvement.  She continues physical therapy, using diclofenac.  She has been icing religiously after performances.  Additional history: as documented    Additional medical/Social/Surgical histories reviewed in [facility] and updated as appropriate.    REVIEW OF SYSTEMS (6/30/2018)  CONSTITUTIONAL: Denies fever and weight loss  GASTROINTESTINAL: Denies abdominal pain, nausea, vomiting  MUSCULOSKELETAL: See HPI  SKIN: Denies any recent rash or lesion  NEUROLOGICAL: Denies numbness or focal weakness     PHYSICAL EXAM  Ht 1.626 m (5' 4\")  Wt 56.7 kg (125 lb)  BMI 21.46 kg/m2    General  - normal appearance, in no obvious distress  CV  - normal popliteal pulse  Pulm  - normal respiratory pattern, non-labored  Musculoskeletal - Left knee  - stance: normal gait without limp, discomfort with single leg squat, no obvious leg length discrepancy  - inspection: no swelling or effusion, normal muscle tone, normal bone and joint alignment, no obvious deformity  - palpation: no joint line tenderness, normal popliteal pulse, tender over distal quadriceps tendon and proximal and midsubstance patellar tendon. "  No patellar facet tenderness.  - ROM: 135 degrees flexion, -5 degrees extension, painful passive flexion terminally  - strength: 5/5 in flexion, 5/5 in extension  - neuro: no sensory or motor deficit  - special tests:  (-) Lachman  (-) anterior drawer  (-) posterior drawer  (-) Baron  (-) varus at 0 and 30 degrees flexion  (-) valgus at 0 and 30 degrees flexion  (-) Emory s compression test  Neuro  - no sensory or motor deficit, grossly normal coordination, normal muscle tone  Skin  - no ecchymosis, erythema, warmth, or induration, no obvious rash  Psych  - interactive, appropriate, normal mood and affect    US used to evaluate quadriceps and patellar tendon.  There is no evidence LTD IN-OFFICE: High frequency doppler transducer patellar or quadriceps tendon tearing.  Mild tendinosis of patellar tendon seen.  No effusion of the knee.     ASSESSMENT & PLAN  Ms. Jenkins is a 23 year old year old female who presents to clinic today with new onset of left anterior knee pain.  Unfortunately most of her pain is likely related to overuse injury.  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     Diagnosis: Patellar tendonitis, left knee. Quadriceps tendonitis, left.    -Ice to anterior bilateral knees  -PT to address PT, QT on left knee, taping left knee as well as right  -Diclo-Max compounding cream up to QID  -HEP  -Rest when able; avoid high heels during rehearsals if able.  -Follow up 4 weeks.    It was a pleasure seeing Val.    Lisandro Li DO, [facility]  Primary [facility] Medicine

## 2018-07-02 ENCOUNTER — THERAPY VISIT (OUTPATIENT)
Dept: PHYSICAL THERAPY | Facility: CLINIC | Age: 23
End: 2018-07-02
Payer: OTHER MISCELLANEOUS

## 2018-07-02 DIAGNOSIS — M25.562 LEFT ANTERIOR KNEE PAIN: Primary | ICD-10-CM

## 2018-07-02 DIAGNOSIS — M25.561 KNEE PAIN, RIGHT: ICD-10-CM

## 2018-07-02 PROBLEM — M50.20 HERNIATED CERVICAL INTERVERTEBRAL DISC: Status: ACTIVE | Noted: 2018-07-02

## 2018-07-02 PROBLEM — K58.9 IRRITABLE BOWEL SYNDROME (IBS): Status: ACTIVE | Noted: 2018-07-02

## 2018-07-02 PROCEDURE — 97140 MANUAL THERAPY 1/> REGIONS: CPT | Mod: GP | Performed by: PHYSICAL THERAPIST

## 2018-07-02 PROCEDURE — 97110 THERAPEUTIC EXERCISES: CPT | Mod: GP | Performed by: PHYSICAL THERAPIST

## 2018-07-06 ENCOUNTER — THERAPY VISIT (OUTPATIENT)
Dept: PHYSICAL THERAPY | Facility: CLINIC | Age: 23
End: 2018-07-06
Payer: OTHER MISCELLANEOUS

## 2018-07-06 DIAGNOSIS — M25.561 KNEE PAIN, RIGHT: ICD-10-CM

## 2018-07-06 DIAGNOSIS — R60.9 EDEMA, UNSPECIFIED TYPE: Primary | ICD-10-CM

## 2018-07-06 PROCEDURE — 97112 NEUROMUSCULAR REEDUCATION: CPT | Mod: GP | Performed by: PHYSICAL THERAPIST

## 2018-07-06 PROCEDURE — 97140 MANUAL THERAPY 1/> REGIONS: CPT | Mod: GP | Performed by: PHYSICAL THERAPIST

## 2018-07-06 PROCEDURE — 97016 VASOPNEUMATIC DEVICE THERAPY: CPT | Mod: GP | Performed by: PHYSICAL THERAPIST

## 2018-07-09 ENCOUNTER — THERAPY VISIT (OUTPATIENT)
Dept: PHYSICAL THERAPY | Facility: CLINIC | Age: 23
End: 2018-07-09
Payer: OTHER MISCELLANEOUS

## 2018-07-09 DIAGNOSIS — R60.9 EDEMA, UNSPECIFIED TYPE: ICD-10-CM

## 2018-07-09 DIAGNOSIS — M25.561 KNEE PAIN, RIGHT: ICD-10-CM

## 2018-07-09 PROCEDURE — 97140 MANUAL THERAPY 1/> REGIONS: CPT | Mod: GP | Performed by: PHYSICAL THERAPIST

## 2018-07-09 PROCEDURE — 97110 THERAPEUTIC EXERCISES: CPT | Mod: GP | Performed by: PHYSICAL THERAPIST

## 2018-07-09 PROCEDURE — 97016 VASOPNEUMATIC DEVICE THERAPY: CPT | Mod: GP | Performed by: PHYSICAL THERAPIST

## 2018-07-10 ENCOUNTER — MYC MEDICAL ADVICE (OUTPATIENT)
Dept: ORTHOPEDICS | Facility: CLINIC | Age: 23
End: 2018-07-10

## 2018-07-10 NOTE — TELEPHONE ENCOUNTER
Patient message sent. Encouraged to complete MRI and follow up ASAP.      Lisandro Li, DO [facility]  Primary Care Sports Medicine

## 2018-07-12 ENCOUNTER — TELEPHONE (OUTPATIENT)
Dept: ORTHOPEDICS | Facility: CLINIC | Age: 23
End: 2018-07-12

## 2018-07-12 ENCOUNTER — OFFICE VISIT (OUTPATIENT)
Dept: FAMILY MEDICINE | Facility: CLINIC | Age: 23
End: 2018-07-12
Payer: OTHER MISCELLANEOUS

## 2018-07-12 VITALS
RESPIRATION RATE: 16 BRPM | OXYGEN SATURATION: 99 % | HEART RATE: 74 BPM | WEIGHT: 123 LBS | TEMPERATURE: 97.8 F | SYSTOLIC BLOOD PRESSURE: 94 MMHG | HEIGHT: 65 IN | BODY MASS INDEX: 20.49 KG/M2 | DIASTOLIC BLOOD PRESSURE: 60 MMHG

## 2018-07-12 DIAGNOSIS — M25.561 RIGHT KNEE PAIN, UNSPECIFIED CHRONICITY: Primary | ICD-10-CM

## 2018-07-12 PROCEDURE — 99203 OFFICE O/P NEW LOW 30 MIN: CPT | Performed by: FAMILY MEDICINE

## 2018-07-12 RX ORDER — HYDROCODONE BITARTRATE AND ACETAMINOPHEN 5; 325 MG/1; MG/1
1 TABLET ORAL 3 TIMES DAILY PRN
Qty: 18 TABLET | Refills: 0 | Status: SHIPPED | OUTPATIENT
Start: 2018-07-12 | End: 2019-05-01

## 2018-07-12 NOTE — MR AVS SNAPSHOT
After Visit Summary   7/12/2018    Val Jenkins    MRN: [id number]           Patient Information     Date Of Birth          1995        Visit Information        Provider Department      7/12/2018 11:30 AM Lynne Saavedra MD [facility]        Today's Diagnoses     Right knee pain, unspecified chronicity    -  1       Follow-ups after your visit        Your next 10 appointments already scheduled     Jul 13, 2018  9:30 AM CDT   DWIGHT Dance with Livier Rosas PT   [facility] ([facility]  )    [address] [place] [address]   [phone]            Jul 16, 2018 11:30 AM CDT   DWIGHT Dance with Livier Rosas PT   [facility] ([facility]  )    [address] [place] [address]   [phone]            Jul 20, 2018 11:30 AM CDT   DWIGHT Dance with Livier Rosas PT   [facility] ([facility]  )    [address] [place] [address]   [phone]            Jul 23, 2018 12:10 PM CDT   DWIGHT Dance with Livier Rosas PT   [facility] ([facility]  )    [address] [place] [address]   [phone]            Jul 27, 2018 11:30 AM CDT   DWIGHT Dance with Livier Rosas PT   [facility] ([facility]  )    [address] [place] [address]   [phone]            Jul 30, 2018 12:10 PM CDT   DWIGHT Dance with Livier Rosas PT   [facility] ([facility]  )    [address] [place] [address]   [phone]            Aug 03, 2018 12:10 PM CDT   DWIGHT Dance with Livier Rosas PT   [facility] ([facility]  )    [facility] [address]   [phone]            Aug 06, 2018 12:10 PM CDT   DWIGHT Dance with Livier Rosas PT   [facility] ([facility]  )    [address] [place] [address]   [phone]            Aug 10, 2018 11:30 AM CDT   DWIGHT Dance with Livier Rosas PT   [facility] ([facility]  )    [address] [place] [address]   [phone]            Aug 13, 2018 11:30 AM CDT   DWIGHT Dance with Livier Rosas PT   [facility] ([facility]  )    [address] [place] [address]   [phone]              Who to contact     If you have questions or need follow up information about today's clinic visit or your schedule please contact [facility] directly at [phone].  Normal or non-critical lab and imaging results will be communicated to you by [facility], letter or phone within 4 business days after the clinic has received the results. If you do not hear from us within 7 days, please contact the clinic through [employer] or phone. If you have a critical or abnormal lab result, we will notify you by phone as soon as possible.  Submit refill requests through [employer] or call your pharmacy and they will forward the refill request to us. Please allow 3 business days for your refill to be completed.          Additional Information About Your Visit        [facility] Information     [employer] gives you secure access to your electronic health record. If you see a primary care provider, you can also send messages to your care team and make appointments. If you have questions, please call your primary care clinic.  If you do not have a primary care provider, please call [phone] and they will assist you.        Care EveryWhere ID     This is your Care EveryWhere ID. This could be used by other organizations  "to access your [facility] medical records  [id number]        Your Vitals Were     Pulse Temperature Respirations Height Last Period Pulse Oximetry    74 97.8  F (36.6  C) (Oral) 16 5' 4.5\" (1.638 m) 07/11/2018 99%    BMI (Body Mass Index)                   20.79 kg/m2            Blood Pressure from Last 3 Encounters:   07/12/18 94/60   06/09/18 112/77   06/02/18 101/71    Weight from Last 3 Encounters:   07/12/18 123 lb (55.8 kg)   06/29/18 125 lb (56.7 kg)   06/09/18 125 lb (56.7 kg)              Today, you had the following     No orders found for display         Today's Medication Changes          These changes are accurate as of 7/12/18 12:22 PM.  If you have any questions, ask your nurse or doctor.               Start taking these medicines.        Dose/Directions    HYDROcodone-acetaminophen 5-325 MG per tablet   Commonly known as:  NORCO   Used for:  Right knee pain, unspecified chronicity   Started by:  Lynne Saavedra MD        Dose:  1 tablet   Take 1 tablet by mouth 3 times daily as needed for pain   Quantity:  18 tablet   Refills:  0            Where to get your medicines      Some of these will need a paper prescription and others can be bought over the counter.  Ask your nurse if you have questions.     Bring a paper prescription for each of these medications     HYDROcodone-acetaminophen 5-325 MG per tablet               Information about OPIOIDS     PRESCRIPTION OPIOIDS: WHAT YOU NEED TO KNOW   We gave you an opioid (narcotic) pain medicine. It is important to manage your pain, but opioids are not always the best choice. You should first try all the other options your care team gave you. Take this medicine for as short a time (and as few doses) as possible.     These medicines have risks:    DO NOT drive when on new or higher doses of pain medicine. These medicines can affect your alertness and reaction times, and you could be arrested for driving under the influence (DUI). If you need to use " opioids long-term, talk to your care team about driving.    DO NOT operate heave machinery    DO NOT do any other dangerous activities while taking these medicines.     DO NOT drink any alcohol while taking these medicines.      If the opioid prescribed includes acetaminophen, DO NOT take with any other medicines that contain acetaminophen. Read all labels carefully. Look for the word  acetaminophen  or  Tylenol.  Ask your pharmacist if you have questions or are unsure.    You can get addicted to pain medicines, especially if you have a history of addiction (chemical, alcohol or substance dependence). Talk to your care team about ways to reduce this risk.    Store your pills in a secure place, locked if possible. We will not replace any lost or stolen medicine. If you don t finish your medicine, please throw away (dispose) as directed by your pharmacist. The [place] Pollution Control Agency has more information about safe disposal: https://www.pca.[facility].mn.us/living-green/managing-unwanted-medications.     All opioids tend to cause constipation. Drink plenty of water and eat foods that have a lot of fiber, such as fruits, vegetables, prune juice, apple juice and high-fiber cereal. Take a laxative (Miralax, milk of magnesia, Colace, Senna) if you don t move your bowels at least every other day.          Primary Care Provider Fax #    Physician No Ref-Primary [phone]       No address on file        Equal Access to Services     WILMER GONZALEZ : Julius arreaga Somaria fernanda, waaxda luqadaha, qaybta kaalmada adegonzales, neeraj maloney. So [facility] [phone].    ATENCIÓN: Si habla español, tiene a reed disposición servicios gratuitos de asistencia lingüística. Llame al [phone].    We comply with applicable federal civil rights laws and [place] laws. We do not discriminate on the basis of race, color, national origin, age, disability, sex, sexual orientation, or gender identity.             Thank you!     Thank you for choosing [facility]  for your care. Our goal is always to provide you with excellent care. Hearing back from our patients is one way we can continue to improve our services. Please take a few minutes to complete the written survey that you may receive in the mail after your visit with us. Thank you!             Your Updated Medication List - Protect others around you: Learn how to safely use, store and throw away your medicines at www.disposemymeds.org.          This list is accurate as of 7/12/18 12:22 PM.  Always use your most recent med list.                   Brand Name Dispense Instructions for use Diagnosis    diclofenac 75 MG EC tablet    VOLTAREN    60 tablet    Take 1 tablet (75 mg) by mouth 2 times daily as needed for moderate pain    Arthralgia of lower leg, unspecified laterality       HYDROcodone-acetaminophen 5-325 MG per tablet    NORCO    18 tablet    Take 1 tablet by mouth 3 times daily as needed for pain    Right knee pain, unspecified chronicity       norethin-eth estrad triphasic 0.5/1/0.5-35 MG-MCG per tablet    PAKO BENSON TRI-NORINYL

## 2018-07-12 NOTE — NURSING NOTE
"Chief Complaint   Patient presents with     Musculoskeletal Problem     BP 94/60  Pulse 74  Temp 97.8  F (36.6  C) (Oral)  Resp 16  Ht 5' 4.5\" (1.638 m)  Wt 123 lb (55.8 kg)  LMP 07/11/2018  SpO2 99%  BMI 20.79 kg/m2 Estimated body mass index is 20.79 kg/(m^2) as calculated from the following:    Height as of this encounter: 5' 4.5\" (1.638 m).    Weight as of this encounter: 123 lb (55.8 kg).  bp completed using cuff size: regular       Health Maintenance addressed:  Pap Smear    Pt will schedule physical  appt.    Julia Rowland MA     "

## 2018-07-12 NOTE — PROGRESS NOTES
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 Benites/Jill [facility] [place]  [facility]  Had an MRI of knee  Was diagnosed with Hoffas syndrome  Has not had good response to therapy  Was offered injection in most recent correspondence - would consider this            Problem list and histories reviewed & adjusted, as indicated.  Additional history: as documented    Patient Active Problem List   Diagnosis     Metatarsalgia of left foot     Knee pain, right     Herniated cervical intervertebral disc     Irritable bowel syndrome (IBS)     Edema, unspecified type     Past Surgical History:   Procedure Laterality Date     NO HISTORY OF SURGERY         Social History   Substance Use Topics     Smoking status: Never Smoker     Smokeless tobacco: Never Used     Alcohol use Yes     Family History   Problem Relation Age of Onset     Coronary Artery Disease No family hx of          Current Outpatient Prescriptions   Medication Sig Dispense Refill     diclofenac (VOLTAREN) 75 MG EC tablet Take 1 tablet (75 mg) by mouth 2 times daily as needed for moderate pain 60 tablet 1     HYDROcodone-acetaminophen (NORCO) 5-325 MG per tablet Take 1 tablet by mouth  "3 times daily as needed for pain 18 tablet 0     norethin-eth estrad triphasic (ARANELLE, PAKO, TRI-NORINYL) 0.5/1/0.5-35 MG-MCG per tablet          Reviewed and updated as needed this visit by clinical staff       Reviewed and updated as needed this visit by Provider         ROS:  Constitutional, HEENT, cardiovascular, pulmonary, gi and gu systems are negative, except as otherwise noted.    OBJECTIVE:                                                    BP 94/60  Pulse 74  Temp 97.8  F (36.6  C) (Oral)  Resp 16  Ht 5' 4.5\" (1.638 m)  Wt 123 lb (55.8 kg)  LMP 07/11/2018  SpO2 99%  BMI 20.79 kg/m2  Body mass index is 20.79 kg/(m^2).  GENERAL APPEARANCE: healthy, alert and no distress  ORTHO: Bilateral knees show no inflammation redness or warmth.  There is slight crepitus with flexion.  Tenderness is along the right joint line area of the right knee more so than anywhere else.         ASSESSMENT/PLAN:                                                    1. Right knee pain, unspecified chronicity  discussed that short term narcotics are OK but long term would have her see Sports med for knee injection and if this is not helping could consider ortho consult or 2nd opinion  - HYDROcodone-acetaminophen (NORCO) 5-325 MG per tablet; Take 1 tablet by mouth 3 times daily as needed for pain  Dispense: 18 tablet; Refill: 0      Follow up with Provider - should see sports med this saturday     Lynne Saavedra MD  [facility]    "

## 2018-07-12 NOTE — Clinical Note
I asked  her to see you to get an injection  I offered limietd narcotics for short term plan  Thanks  Lynne Saavedra MD

## 2018-07-13 ENCOUNTER — THERAPY VISIT (OUTPATIENT)
Dept: PHYSICAL THERAPY | Facility: CLINIC | Age: 23
End: 2018-07-13
Payer: OTHER MISCELLANEOUS

## 2018-07-13 DIAGNOSIS — M25.561 ACUTE PAIN OF RIGHT KNEE: ICD-10-CM

## 2018-07-13 DIAGNOSIS — R60.9 EDEMA, UNSPECIFIED TYPE: ICD-10-CM

## 2018-07-13 PROCEDURE — 97110 THERAPEUTIC EXERCISES: CPT | Mod: GP | Performed by: PHYSICAL THERAPIST

## 2018-07-13 PROCEDURE — 97140 MANUAL THERAPY 1/> REGIONS: CPT | Mod: GP | Performed by: PHYSICAL THERAPIST

## 2018-07-13 PROCEDURE — 97112 NEUROMUSCULAR REEDUCATION: CPT | Mod: GP | Performed by: PHYSICAL THERAPIST

## 2018-07-13 ASSESSMENT — PATIENT HEALTH QUESTIONNAIRE - PHQ9: SUM OF ALL RESPONSES TO PHQ QUESTIONS 1-9: 6

## 2018-07-14 ENCOUNTER — OFFICE VISIT (OUTPATIENT)
Dept: ORTHOPEDICS | Facility: CLINIC | Age: 23
End: 2018-07-14
Payer: COMMERCIAL

## 2018-07-14 VITALS
HEART RATE: 68 BPM | DIASTOLIC BLOOD PRESSURE: 61 MMHG | WEIGHT: 123 LBS | HEIGHT: 65 IN | SYSTOLIC BLOOD PRESSURE: 93 MMHG | BODY MASS INDEX: 20.49 KG/M2

## 2018-07-14 DIAGNOSIS — M25.561 PATELLOFEMORAL JOINT PAIN, RIGHT: ICD-10-CM

## 2018-07-14 DIAGNOSIS — M76.52 PATELLAR TENDINITIS OF LEFT KNEE: Primary | ICD-10-CM

## 2018-07-14 NOTE — LETTER
7/14/2018       RE: Val Jenkins  [address]  [facility] [address]     Dear Colleague,    Thank you for referring your patient, Val Jenkins, to the [facility] SPORTS AND ORTHOPAEDIC WALK IN CLINIC at [facility]. Please see a copy of my visit note below.          SPORTS & ORTHOPEDIC WALK-IN FOLLOW-UP VISIT 7/14/2018    Interval History:     Follow up reason: Recheck knee    Date of injury: 6/25/18    Date last seen: 6/29/18    Following Therapeutic Plan: Yes     Pain: Improving    Function: Improving    Interval History: Still having a decent amount of pain. 60 or 70% recovered.      Medical History:    Any recent changes to your medical history? No    Any new medication prescribed since last visit? No    Review of Systems:    Do you have fever, chills, weight loss? No    Do you have any vision problems? No    Do you have any chest pain or edema? No    Do you have any shortness of breath or wheezing?  No    Do you have stomach problems? No    Do you have any numbness or focal weakness? No    Do you have diabetes? No    Do you have problems with bleeding or clotting? No    Do you have an rashes or other skin lesions? No             ESTABLISHED PATIENT FOLLOW-UP:  Pain and Follow Up For of the Left Knee       HISTORY OF PRESENT ILLNESS  Ms. Jenkins is a pleasant 23 year old year old female who presents to clinic today for follow-up of bilateral knee pain.  She notes improvement in bilateral knee pain over the last week.  We had discussed consideration for knee injection today if PFPS continued to be severe. 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  Patient notes she has been using the diclofenac cream on left anterior knee but not the right very often.  +Taping techniques bilaterally.  Also continuing her HEP/PT.  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  Using diclofenac prn. Saw her PCP earlier this week who wrote a small script for norco but she did not take it due to concerns regarding side effects.     Left knee, pain at patella/hoffa's fat pad.  Worse with deep bending  RIght knee pain isolates to patella and in proximal patellar tendon.  No mechanical symptoms.    Additional medical/Social/Surgical histories reviewed in [facility] and updated as appropriate.    REVIEW OF SYSTEMS (7/16/2018)  CONSTITUTIONAL: Denies fever and weight loss  GASTROINTESTINAL: Denies abdominal pain, nausea, vomiting  MUSCULOSKELETAL: See HPI  SKIN: Denies any recent rash or lesion  NEUROLOGICAL: Denies numbness or focal weakness     PHYSICAL EXAM  BP 93/61  Pulse 68  Ht 1.638 m (5' 4.5\")  Wt 55.8 kg (123 lb)  LMP 07/11/2018  BMI 20.79 kg/m2      General  - normal appearance, in no obvious distress  CV  - normal popliteal pulse  Pulm  - normal respiratory pattern, non-labored  Musculoskeletal - Left knee  - stance: normal gait without limp, discomfort with single leg squat, no obvious leg length discrepancy  - inspection: no swelling or effusion, normal muscle tone, normal bone and joint alignment, no obvious deformity  - palpation: no joint line tenderness, normal popliteal pulse, tender over distal quadriceps tendon and proximal and midsubstance patellar tendon.  No patellar facet tenderness.  - ROM: 135 degrees flexion, -5 degrees extension, painful passive flexion terminally  - strength: 5/5 in flexion, 5/5 in extension  - neuro: no sensory or motor deficit  Musculoskeletal - Right knee  - inspection: no swelling or effusion, normal muscle tone, normal bone and joint alignment, no obvious deformity  - palpation: no joint line tenderness, patellar tendon non-tender, tender medial and lateral patellar facet. TTP inferior pole of patella/patellar tendon.  - ROM: 135 degrees flexion, -5 degrees extension, not painful  - strength: 5/5 in flexion, " 5/5 in extension  - neuro: no sensory or motor deficit  Neuro  - no sensory or motor deficit, grossly normal coordination, normal muscle tone  Skin  - no ecchymosis, erythema, warmth, or induration, no obvious rash  Psych  - interactive, appropriate, normal mood and affect    ASSESSMENT & PLAN  Ms. Jenkins is a 23 year old year old female who presents to clinic today for reevaluation of bilateral knee pain.  Patient is now achieving pain relief from current regimen. Discussed possibility of right knee CSI for more severe pain. We also discussed the possibility of a hoffa's fat pad hydrodissection in clinic as another alternative if current regimen is not providing adequate relief.     - Continue PT/HEP  - Patellar taping techniques  - Ice with EZ-Wrap after every show and HEP  - Diclofenac cream to bilateral knees up to QID  - Analgesics; diclofenac PRN (avoid same time with cream)  - Follow up 4 weeks; sooner if worsening consider injection/hydrodissection    It was a pleasure seeing Val.    Lisandro Li, , CAQSM  Primary Care Sports Medicine

## 2018-07-14 NOTE — PROGRESS NOTES
SPORTS & ORTHOPEDIC WALK-IN FOLLOW-UP VISIT 7/14/2018    Interval History:     Follow up reason: Recheck knee    Date of injury: 6/25/18    Date last seen: 6/29/18    Following Therapeutic Plan: Yes     Pain: Improving    Function: Improving    Interval History: Still having a decent amount of pain. 60 or 70% recovered.      Medical History:    Any recent changes to your medical history? No    Any new medication prescribed since last visit? No    Review of Systems:    Do you have fever, chills, weight loss? No    Do you have any vision problems? No    Do you have any chest pain or edema? No    Do you have any shortness of breath or wheezing?  No    Do you have stomach problems? No    Do you have any numbness or focal weakness? No    Do you have diabetes? No    Do you have problems with bleeding or clotting? No    Do you have an rashes or other skin lesions? No

## 2018-07-14 NOTE — MR AVS SNAPSHOT
After Visit Summary   7/14/2018    Val Jenkins    MRN: [id number]           Patient Information     Date Of Birth          1995        Visit Information        Provider Department      7/14/2018 4:00 PM Lisandro Li DO [facility]        Today's Diagnoses     Patellar tendinitis of left knee    -  1    Patellofemoral joint pain, right           Follow-ups after your visit        Your next 10 appointments already scheduled     Jul 20, 2018 11:30 AM CDT   DWIGHT Dance with Livier Rosas PT   [facility] [facility] ([facility]  )    [address] [place] [address]   [phone]            Jul 23, 2018 12:10 PM CDT   DWIGHT Dance with Livier Rosas PT   [place] [facility] ([facility]  )    [address] [place] [address]   [phone]            Jul 27, 2018 11:30 AM CDT   DIWGHT Dance with Livier Rosas PT   [place] [facility] ([facility]  )    [address] [place] [address]   [phone]            Jul 30, 2018 12:10 PM CDT   DWIGHT Dance with Livier Rosas PT   [place] [facility] ([facility]  )    [address] [place] [address]   [phone]            Aug 03, 2018 12:10 PM CDT   DWIGHT Dance with Livier Rosas PT   [place] [facility] ([facility]  )    [address] [place] [address]   [phone]            Aug 06, 2018 12:10 PM CDT   DWIGHT Dance with Livier Rosas PT   [place] [facility] ([facility]  )    [address] [place] [address]   [phone]            Aug 10, 2018 11:30 AM CDT   DWIGHT Dance with Livier Rosas PT   [place] [facility]  Therapy ([facility]  )    [address] [place] [address]   [phone]            Aug 15, 2018 11:30 AM CDT   IAM Dance with Livier Rosas PT   [facility] [address] Physical Therapy ([facility]  )    [address] [place] [address]   [phone]            Aug 17, 2018 11:30 AM CDT   IAM Dance with Livier Rosas PT   [facility] [address] Physical Therapy ([facility]  )    [address] [place] [address]   [phone]            Aug 20, 2018 11:30 AM CDT   IAM Dance with Livier Rosas PT   [facility] [address] Physical Therapy ([facility]  )    [address] [place] [address]   [phone]              Who to contact     Please call your clinic at [phone] to:    Ask questions about your health    Make or cancel appointments    Discuss your medicines    Learn about your test results    Speak to your doctor            Additional Information About Your Visit        Volu[employer] Information     [employer] gives you secure access to your electronic health record. If you see a primary care provider, you can also send messages to your care team and make appointments. If you have questions, please call your primary care clinic.  If you do not have a primary care provider, please call [phone] and they will assist you.      [employer] is an electronic gateway that provides easy, online access to your medical records. With [employer], you can request a clinic appointment, read your test results, renew a prescription or communicate with your care team.     To access your existing account, please contact your [facility] or call [phone] for assistance.        Care EveryWhere ID     This is your Care EveryWhere ID. This could be used by other organizations to access your [place] medical records  [id number]        Your Vitals Were     Pulse  "Height Last Period BMI (Body Mass Index)          68 1.638 m (5' 4.5\") 07/11/2018 20.79 kg/m2         Blood Pressure from Last 3 Encounters:   07/14/18 93/61   07/12/18 94/60   06/09/18 112/77    Weight from Last 3 Encounters:   07/14/18 55.8 kg (123 lb)   07/12/18 55.8 kg (123 lb)   06/29/18 56.7 kg (125 lb)              Today, you had the following     No orders found for display       Primary Care Provider Fax #    Physician No Ref-Primary [phone]       No address on file        Equal Access to Services     [facility]: Julius Marino, sharmin palma, ronni borja, neeraj arreguin . [facility] [phone].    ATENCIÓN: Si habla español, tiene a reed disposición servicios gratuitos de asistencia lingüística. [facility] [phone].    We comply with applicable federal civil rights laws and [place] laws. We do not discriminate on the basis of race, color, national origin, age, disability, sex, sexual orientation, or gender identity.            Thank you!     Thank you for choosing [facility] CLIN[facility]  for your care. Our goal is always to provide you with excellent care. Hearing back from our patients is one way we can continue to improve our services. Please take a few minutes to complete the written survey that you may receive in the mail after your visit with us. Thank you!             Your Updated Medication List - Protect others around you: Learn how to safely use, store and throw away your medicines at www.disposemymeds.org.          This list is accurate as of 7/14/18 11:59 PM.  Always use your most recent med list.                   Brand Name Dispense Instructions for use Diagnosis    diclofenac 75 MG EC tablet    VOLTAREN    60 tablet    Take 1 tablet (75 mg) by mouth 2 times daily as needed for moderate pain    Arthralgia of lower leg, unspecified laterality       HYDROcodone-acetaminophen 5-325 MG per tablet    NORCO    " 18 tablet    Take 1 tablet by mouth 3 times daily as needed for pain    Right knee pain, unspecified chronicity       norethin-eth estrad triphasic 0.5/1/0.5-35 MG-MCG per tablet    PAKO BENSON TRI-NORINYL

## 2018-07-16 ENCOUNTER — THERAPY VISIT (OUTPATIENT)
Dept: PHYSICAL THERAPY | Facility: CLINIC | Age: 23
End: 2018-07-16
Payer: OTHER MISCELLANEOUS

## 2018-07-16 DIAGNOSIS — R60.9 EDEMA, UNSPECIFIED TYPE: ICD-10-CM

## 2018-07-16 DIAGNOSIS — M25.561 ACUTE PAIN OF RIGHT KNEE: ICD-10-CM

## 2018-07-16 PROCEDURE — 97140 MANUAL THERAPY 1/> REGIONS: CPT | Mod: GP | Performed by: PHYSICAL THERAPIST

## 2018-07-16 PROCEDURE — 97110 THERAPEUTIC EXERCISES: CPT | Mod: GP | Performed by: PHYSICAL THERAPIST

## 2018-07-16 NOTE — PROGRESS NOTES
"ESTABLISHED PATIENT FOLLOW-UP:  Pain and Follow Up For of the Left Knee       HISTORY OF PRESENT ILLNESS  Ms. Jenkins is a pleasant 23 year old year old female who presents to clinic today for follow-up of bilateral knee pain.  She notes improvement in bilateral knee pain over the last week.  We had discussed consideration for knee injection today if PFPS continued to be severe. 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  Patient notes she has been using the diclofenac cream on left anterior knee but not the right very often.  +Taping techniques bilaterally.  Also continuing her HEP/PT.  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  Using diclofenac prn. Saw her PCP earlier this week who wrote a small script for norco but she did not take it due to concerns regarding side effects.     Left knee, pain at patella/hoffa's fat pad.  Worse with deep bending  RIght knee pain isolates to patella and in proximal patellar tendon.  No mechanical symptoms.    Additional medical/Social/Surgical histories reviewed in [facility] and updated as appropriate.    REVIEW OF SYSTEMS (7/16/2018)  CONSTITUTIONAL: Denies fever and weight loss  GASTROINTESTINAL: Denies abdominal pain, nausea, vomiting  MUSCULOSKELETAL: See HPI  SKIN: Denies any recent rash or lesion  NEUROLOGICAL: Denies numbness or focal weakness     PHYSICAL EXAM  BP 93/61  Pulse 68  Ht 1.638 m (5' 4.5\")  Wt 55.8 kg (123 lb)  LMP 07/11/2018  BMI 20.79 kg/m2      General  - normal appearance, in no obvious distress  CV  - normal popliteal pulse  Pulm  - normal respiratory pattern, non-labored  Musculoskeletal - Left knee  - stance: normal gait without limp, discomfort with single leg squat, no obvious leg length discrepancy  - inspection: no swelling or effusion, normal muscle tone, normal bone and joint alignment, no obvious deformity  - " palpation: no joint line tenderness, normal popliteal pulse, tender over distal quadriceps tendon and proximal and midsubstance patellar tendon.  No patellar facet tenderness.  - ROM: 135 degrees flexion, -5 degrees extension, painful passive flexion terminally  - strength: 5/5 in flexion, 5/5 in extension  - neuro: no sensory or motor deficit  Musculoskeletal - Right knee  - inspection: no swelling or effusion, normal muscle tone, normal bone and joint alignment, no obvious deformity  - palpation: no joint line tenderness, patellar tendon non-tender, tender medial and lateral patellar facet. TTP inferior pole of patella/patellar tendon.  - ROM: 135 degrees flexion, -5 degrees extension, not painful  - strength: 5/5 in flexion, 5/5 in extension  - neuro: no sensory or motor deficit  Neuro  - no sensory or motor deficit, grossly normal coordination, normal muscle tone  Skin  - no ecchymosis, erythema, warmth, or induration, no obvious rash  Psych  - interactive, appropriate, normal mood and affect    ASSESSMENT & PLAN  Ms. Jenkins is a 23 year old year old female who presents to clinic today for reevaluation of bilateral knee pain.  Patient is now achieving pain relief from current regimen. Discussed possibility of right knee CSI for more severe pain. We also discussed the possibility of a hoffa's fat pad hydrodissection in clinic as another alternative if current regimen is not providing adequate relief.     - Continue PT/HEP  - Patellar taping techniques  - Ice with EZ-Wrap after every show and HEP  - Diclofenac cream to bilateral knees up to QID  - Analgesics; diclofenac PRN (avoid same time with cream)  - Follow up 4 weeks; sooner if worsening consider injection/hydrodissection    It was a pleasure seeing Val.    Lisandro Li, , [facility]  Primary Care Sports Medicine

## 2018-07-20 ENCOUNTER — THERAPY VISIT (OUTPATIENT)
Dept: PHYSICAL THERAPY | Facility: CLINIC | Age: 23
End: 2018-07-20
Payer: OTHER MISCELLANEOUS

## 2018-07-20 DIAGNOSIS — M25.561 ACUTE PAIN OF RIGHT KNEE: ICD-10-CM

## 2018-07-20 DIAGNOSIS — R60.9 EDEMA, UNSPECIFIED TYPE: ICD-10-CM

## 2018-07-20 PROCEDURE — 97140 MANUAL THERAPY 1/> REGIONS: CPT | Mod: GP | Performed by: PHYSICAL THERAPIST

## 2018-07-20 PROCEDURE — 97110 THERAPEUTIC EXERCISES: CPT | Mod: GP | Performed by: PHYSICAL THERAPIST

## 2018-07-23 ENCOUNTER — THERAPY VISIT (OUTPATIENT)
Dept: PHYSICAL THERAPY | Facility: CLINIC | Age: 23
End: 2018-07-23
Payer: OTHER MISCELLANEOUS

## 2018-07-23 DIAGNOSIS — M25.561 ACUTE PAIN OF RIGHT KNEE: ICD-10-CM

## 2018-07-23 DIAGNOSIS — R60.9 EDEMA, UNSPECIFIED TYPE: ICD-10-CM

## 2018-07-23 PROCEDURE — 97140 MANUAL THERAPY 1/> REGIONS: CPT | Mod: GP | Performed by: PHYSICAL THERAPIST

## 2018-07-24 ENCOUNTER — OFFICE VISIT (OUTPATIENT)
Dept: ORTHOPEDICS | Facility: CLINIC | Age: 23
End: 2018-07-24
Payer: COMMERCIAL

## 2018-07-24 VITALS
SYSTOLIC BLOOD PRESSURE: 115 MMHG | WEIGHT: 123 LBS | HEIGHT: 65 IN | HEART RATE: 80 BPM | BODY MASS INDEX: 20.49 KG/M2 | DIASTOLIC BLOOD PRESSURE: 57 MMHG

## 2018-07-24 DIAGNOSIS — M25.561 PATELLOFEMORAL JOINT PAIN, RIGHT: ICD-10-CM

## 2018-07-24 DIAGNOSIS — M25.562 PATELLOFEMORAL JOINT PAIN, LEFT: Primary | ICD-10-CM

## 2018-07-24 DIAGNOSIS — M25.562 ACUTE PAIN OF LEFT KNEE: ICD-10-CM

## 2018-07-24 RX ORDER — TRAMADOL HYDROCHLORIDE 50 MG/1
50 TABLET ORAL EVERY 6 HOURS PRN
Qty: 12 TABLET | Refills: 0 | Status: SHIPPED | OUTPATIENT
Start: 2018-07-24 | End: 2019-05-01

## 2018-07-24 NOTE — LETTER
7/24/2018       RE: Val Jenkins  [address]  [place] [address]     Dear Colleague,    Thank you for referring your patient, Val Jenkins, to the [facility] SP[facility]TS [facility] ORTHOPAEDIC WALK IN CLINIC at [facility]. Please see a copy of my visit note below.          SPORTS & ORTHOPEDIC WALK-IN FOLLOW-UP VISIT 7/24/2018    Interval History:     Follow up reason: Increased pain    Date of injury: 6/25/18    Date last seen: 7/14/18    Following Therapeutic Plan: Yes     Pain: Worsening    Function: Worsening    Interval History: Had a good week, was feeling better. Woke up 7/18 with an inflamed fat pad. Now both knees are swollen and painful. MRI scheduled for tomorrow. Feels like muscles around knees are starting to tense up.      Medical History:    Any recent changes to your medical history? No    Any new medication prescribed since last visit? No    Review of Systems:    Do you have fever, chills, weight loss? No    Do you have any vision problems? No    Do you have any chest pain or edema? No    Do you have any shortness of breath or wheezing?  No    Do you have stomach problems? No    Do you have any numbness or focal weakness? No    Do you have diabetes? No    Do you have problems with bleeding or clotting? No    Do you have an rashes or other skin lesions? No             ESTABLISHED PATIENT FOLLOW-UP:  Pain and Follow Up For of the Right Knee and Follow Up For and Pain of the Left Knee       HISTORY OF PRESENT ILLNESS  Ms. Jenkins is a pleasant 23 year old year old female who presents to clinic today for follow-up of left knee pain > right knee pain.  Patient had been doing well until this past week.  Notes she feels as if her left knee has begun to feel unstable since last visit. Noted decreased ROM as if her knee was limited in flexion.  This has since resolved but was present with PT visit with Estela.      Right knee anterior knee pain as worsened as  "she feels like she has been increasing reliance on this knee. Wonders if she should proceed with MRI.    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 Has been resting since Sunday.    Additional medical/Social/Surgical histories reviewed in [facility] and updated as appropriate.    REVIEW OF SYSTEMS (7/25/2018)  CONSTITUTIONAL: Denies fever and weight loss  GASTROINTESTINAL: Denies abdominal pain, nausea, vomiting  MUSCULOSKELETAL: See HPI  SKIN: Denies any recent rash or lesion  NEUROLOGICAL: Denies numbness or focal weakness     PHYSICAL EXAM  /57  Pulse 80  Ht 1.638 m (5' 4.5\")  Wt 55.8 kg (123 lb)  LMP 07/11/2018  BMI 20.79 kg/m2    General  -Tearful, emotionally distraught during visit today  CV  - normal popliteal pulse  Pulm  - normal respiratory pattern, non-labored  Musculoskeletal - Left knee  - stance: normal gait without limp, pain with single leg squat, no obvious leg length discrepancy  - inspection: no swelling or effusion, normal muscle tone, normal bone and joint alignment, no obvious deformity  - palpation: no joint line tenderness, normal popliteal pulse, tender over distal quadriceps tendon and proximal and midsubstance patellar tendon, hoffa's fat pad. Lateral patellar facet tTP.  - ROM: 135 degrees flexion, -5 degrees extension, painful passive flexion terminally  - strength: 5/5 in flexion, 5/5 in extension  - neuro: no sensory or motor deficit  Musculoskeletal - Right knee  - inspection: no swelling or effusion, normal muscle tone, normal bone and joint alignment, no obvious deformity  - palpation: no joint line tenderness, patellar tendon non-tender, tender medial and lateral patellar facet. TTP inferior pole of patella/patellar tendon.  - ROM: 135 degrees flexion, -5 degrees extension, not painful  - strength: 5/5 " in flexion, 5/5 in extension  - neuro: no sensory or motor deficit  Neuro  - no sensory or motor deficit, grossly normal coordination, normal muscle tone  Skin  - no ecchymosis, erythema, warmth, or induration, no obvious rash  Psych  - interactive, appropriate, normal mood and affect     ASSESSMENT & PLAN  Ms. Jenkins is a 23 year old year old female who presents to clinic today for bilateral knee pain and diagnosed bilateral patellar tendonitis, patellofemoral pain. Left knee with possible meniscal tear given previous symptoms of knee locking/inhibited in flexion.  No clinical suggestion of meniscal tear today.    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    Lisandro Li DO, CAM  Primary Care Sports Medicine

## 2018-07-24 NOTE — PROGRESS NOTES
SPORTS & ORTHOPEDIC WALK-IN FOLLOW-UP VISIT 7/24/2018    Interval History:     Follow up reason: Increased pain    Date of injury: 6/25/18    Date last seen: 7/14/18    Following Therapeutic Plan: Yes     Pain: Worsening    Function: Worsening    Interval History: Had a good week, was feeling better. Woke up 7/18 with an inflamed fat pad. Now both knees are swollen and painful. MRI scheduled for tomorrow. Feels like muscles around knees are starting to tense up.      Medical History:    Any recent changes to your medical history? No    Any new medication prescribed since last visit? No    Review of Systems:    Do you have fever, chills, weight loss? No    Do you have any vision problems? No    Do you have any chest pain or edema? No    Do you have any shortness of breath or wheezing?  No    Do you have stomach problems? No    Do you have any numbness or focal weakness? No    Do you have diabetes? No    Do you have problems with bleeding or clotting? No    Do you have an rashes or other skin lesions? No

## 2018-07-24 NOTE — MR AVS SNAPSHOT
After Visit Summary   7/24/2018    Val Jenkins    MRN: [id number]           Patient Information     Date Of Birth          1995        Visit Information        Provider Department      7/24/2018 5:10 PM Lisandro Li DO [facility]        Today's Diagnoses     Patellofemoral joint pain, left    -  1    Patellofemoral joint pain, right        Acute pain of left knee           Follow-ups after your visit        Your next 10 appointments already scheduled     Jul 27, 2018 11:30 AM CDT   DWIGHT Dance with Livier Rosas PT   [place] [facility] Physical [facility] ([facility]  )    [address] [place] [address]   [phone]            Jul 30, 2018 12:10 PM CDT   DWIGHT Dance with Livier Rosas PT   [facility] ([facility]  )    [address] [place] [address]   [phone]            Aug 03, 2018 12:10 PM CDT   DWIGHT Dance with Livier Rosas PT   [facility] ([facility]  )    [address] [place] [address]   [phone]            Aug 06, 2018 12:10 PM CDT   DWIGHT Dance with Livier Rosas PT   [facility] ([facility]  )    [address] [place] [address]   [phone]            Aug 10, 2018 11:30 AM CDT   DWIGHT Dance with Livier Rosas PT   [facility] Physical Therapy ([facility]  )    [address] [place] [address]   [phone]            Aug 15, 2018 11:30 AM CDT   DWIGHT Dance with Livier Rosas PT   [facility] ([facility]  )    [address] [place] [address]   [phone]            Aug 17, 2018 11:30 AM CDT   DWIGHT Dance with Livier Rosas PT   [facility]  [facility] [place] [facility] ([facility]  )    [address] [facility] [address]   [phone]            Aug 20, 2018 11:30 AM CDT   IAM Dance with Livier Rosas PT   [place] [facility] Physical Therapy ([facility]  )    [address] [facility] [address]   [phone]            Aug 24, 2018 11:30 AM CDT   IAM Dance with Elizabeth Hines, PT   [facility] Physical Therapy ([facility]  )    [address] [place] [address]   [phone]              Who to contact     Please call your clinic at [phone] to:    Ask questions about your health    Make or cancel appointments    Discuss your medicines    Learn about your test results    Speak to your doctor            Additional Information About Your Visit        TRUSTe Information     [employer]USTe gives you secure access to your electronic health record. If you see a primary care provider, you can also send messages to your care team and make appointments. If you have questions, please call your primary care clinic.  If you do not have a primary care provider, please call [phone] and they will assist you.      TRUSTe is an electronic gateway that provides easy, online access to your medical records. With [employer]USTe, you can request a clinic appointment, read your test results, renew a prescription or communicate with your care team.     To access your existing account, please contact your [facility] or call [phone] for assistance.        Care EveryWhere ID     This is your Care EveryWhere ID. This could be used by other organizations to access your [place] medical records  [id number]        Your Vitals Were     Pulse Height Last Period BMI (Body Mass Index)          80 1.638 m (5' 4.5\") 07/11/2018 20.79 kg/m2         Blood Pressure from Last 3 Encounters:   07/24/18 115/57   07/14/18 93/61   07/12/18 94/60    " Weight from Last 3 Encounters:   07/24/18 55.8 kg (123 lb)   07/14/18 55.8 kg (123 lb)   07/12/18 55.8 kg (123 lb)              Today, you had the following     No orders found for display         Today's Medication Changes          These changes are accurate as of 7/24/18 11:59 PM.  If you have any questions, ask your nurse or doctor.               Start taking these medicines.        Dose/Directions    traMADol 50 MG tablet   Commonly known as:  ULTRAM   Used for:  Patellofemoral joint pain, left   Started by:  Lisandro Li DO        Dose:  50 mg   Take 1 tablet (50 mg) by mouth every 6 hours as needed for severe pain   Quantity:  12 tablet   Refills:  0            Where to get your medicines      Some of these will need a paper prescription and others can be bought over the counter.  Ask your nurse if you have questions.     Bring a paper prescription for each of these medications     traMADol 50 MG tablet               Information about OPIOIDS     PRESCRIPTION OPIOIDS: WHAT YOU NEED TO KNOW   We gave you an opioid (narcotic) pain medicine. It is important to manage your pain, but opioids are not always the best choice. You should first try all the other options your care team gave you. Take this medicine for as short a time (and as few doses) as possible.     These medicines have risks:    DO NOT drive when on new or higher doses of pain medicine. These medicines can affect your alertness and reaction times, and you could be arrested for driving under the influence (DUI). If you need to use opioids long-term, talk to your care team about driving.    DO NOT operate heave machinery    DO NOT do any other dangerous activities while taking these medicines.     DO NOT drink any alcohol while taking these medicines.      If the opioid prescribed includes acetaminophen, DO NOT take with any other medicines that contain acetaminophen. Read all labels carefully. Look for the word  acetaminophen  or  Tylenol.  Ask  your pharmacist if you have questions or are unsure.    You can get addicted to pain medicines, especially if you have a history of addiction (chemical, alcohol or substance dependence). Talk to your care team about ways to reduce this risk.    Store your pills in a secure place, locked if possible. We will not replace any lost or stolen medicine. If you don t finish your medicine, please throw away (dispose) as directed by your pharmacist. The [place] Pollution Control Agency has more information about safe disposal: https://www.pca.[facility].mn.us/living-green/managing-unwanted-medications.     All opioids tend to cause constipation. Drink plenty of water and eat foods that have a lot of fiber, such as fruits, vegetables, prune juice, apple juice and high-fiber cereal. Take a laxative (Miralax, milk of magnesia, Colace, Senna) if you don t move your bowels at least every other day.          Primary Care Provider Fax #    Physician No Ref-Primary [phone]       No address on file        Equal Access to Services     WILMER GONZALEZ : Hadii sarika leal hadasho Soomaali, waaxda luqadaha, qaybta kaalmada adeegyada, waxay margarita arreguin . So [facility] [phone].    ATENCIÓN: Si habla español, tiene a reed disposición servicios gratuitos de asistencia lingüística. Ace al [phone].    We comply with applicable federal civil rights laws and [place] laws. We do not discriminate on the basis of race, color, national origin, age, disability, sex, sexual orientation, or gender identity.            Thank you!     Thank you for choosing [facility]  for your care. Our goal is always to provide you with excellent care. Hearing back from our patients is one way we can continue to improve our services. Please take a few minutes to complete the written survey that you may receive in the mail after your visit with us. Thank you!             Your Updated Medication List - Protect  others around you: Learn how to safely use, store and throw away your medicines at www.disposemymeds.org.          This list is accurate as of 7/24/18 11:59 PM.  Always use your most recent med list.                   Brand Name Dispense Instructions for use Diagnosis    diclofenac 75 MG EC tablet    VOLTAREN    60 tablet    Take 1 tablet (75 mg) by mouth 2 times daily as needed for moderate pain    Arthralgia of lower leg, unspecified laterality       HYDROcodone-acetaminophen 5-325 MG per tablet    NORCO    18 tablet    Take 1 tablet by mouth 3 times daily as needed for pain    Right knee pain, unspecified chronicity       norethin-eth estrad triphasic 0.5/1/0.5-35 MG-MCG per tablet    PAKO BENSON, TRI-NORINYL          traMADol 50 MG tablet    ULTRAM    12 tablet    Take 1 tablet (50 mg) by mouth every 6 hours as needed for severe pain    Patellofemoral joint pain, left

## 2018-07-25 ENCOUNTER — RADIANT APPOINTMENT (OUTPATIENT)
Dept: MRI IMAGING | Facility: CLINIC | Age: 23
End: 2018-07-25
Attending: FAMILY MEDICINE
Payer: COMMERCIAL

## 2018-07-25 DIAGNOSIS — M25.562 LEFT ANTERIOR KNEE PAIN: ICD-10-CM

## 2018-07-25 NOTE — PROGRESS NOTES
"ESTABLISHED PATIENT FOLLOW-UP:  Pain and Follow Up For of the Right Knee and Follow Up For and Pain of the Left Knee       HISTORY OF PRESENT ILLNESS  Ms. Jenkins is a pleasant 23 year old year old female who presents to clinic today for follow-up of left knee pain > right knee pain.  Patient had been doing well until this past week.  Notes she feels as if her left knee has begun to feel unstable since last visit. Noted decreased ROM as if her knee was limited in flexion.  This has since resolved but was present with PT visit with Estela.      Right knee anterior knee pain as worsened as she feels like she has been increasing reliance on this knee. Wonders if she should proceed with MRI.    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 Has been resting since Sunday.    Additional medical/Social/Surgical histories reviewed in EPIC and updated as appropriate.    REVIEW OF SYSTEMS (7/25/2018)  CONSTITUTIONAL: Denies fever and weight loss  GASTROINTESTINAL: Denies abdominal pain, nausea, vomiting  MUSCULOSKELETAL: See HPI  SKIN: Denies any recent rash or lesion  NEUROLOGICAL: Denies numbness or focal weakness     PHYSICAL EXAM  /57  Pulse 80  Ht 1.638 m (5' 4.5\")  Wt 55.8 kg (123 lb)  LMP 07/11/2018  BMI 20.79 kg/m2    General  -Tearful, emotionally distraught during visit today  CV  - normal popliteal pulse  Pulm  - normal respiratory pattern, non-labored  Musculoskeletal - Left knee  - stance: normal gait without limp, pain with single leg squat, no obvious leg length discrepancy  - inspection: no swelling or effusion, normal muscle tone, normal bone and joint alignment, no obvious deformity  - palpation: no joint line tenderness, normal popliteal pulse, tender over distal quadriceps tendon and proximal and midsubstance patellar tendon, " hoffa's fat pad. Lateral patellar facet tTP.  - ROM: 135 degrees flexion, -5 degrees extension, painful passive flexion terminally  - strength: 5/5 in flexion, 5/5 in extension  - neuro: no sensory or motor deficit  Musculoskeletal - Right knee  - inspection: no swelling or effusion, normal muscle tone, normal bone and joint alignment, no obvious deformity  - palpation: no joint line tenderness, patellar tendon non-tender, tender medial and lateral patellar facet. TTP inferior pole of patella/patellar tendon.  - ROM: 135 degrees flexion, -5 degrees extension, not painful  - strength: 5/5 in flexion, 5/5 in extension  - neuro: no sensory or motor deficit  Neuro  - no sensory or motor deficit, grossly normal coordination, normal muscle tone  Skin  - no ecchymosis, erythema, warmth, or induration, no obvious rash  Psych  - interactive, appropriate, normal mood and affect     ASSESSMENT & PLAN  Ms. Jenkins is a 23 year old year old female who presents to clinic today for bilateral knee pain and diagnosed bilateral patellar tendonitis, patellofemoral pain. Left knee with possible meniscal tear given previous symptoms of knee locking/inhibited in flexion.  No clinical suggestion of meniscal tear today.    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    Lisandro Li DO, [facility]  Primary Care Sports Medicine

## 2018-07-26 ENCOUNTER — TELEPHONE (OUTPATIENT)
Dept: ORTHOPEDICS | Facility: CLINIC | Age: 23
End: 2018-07-26

## 2018-07-26 NOTE — TELEPHONE ENCOUNTER
MRI results discussed with Val.  No significant findings except for patellar maltracking and mild prepatellar and tibial bursitis.    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  Continue PT, Diclofenac.     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    Lisandro Li, DO [facility]  Primary Care Sports Medicine

## 2018-07-27 ENCOUNTER — THERAPY VISIT (OUTPATIENT)
Dept: PHYSICAL THERAPY | Facility: CLINIC | Age: 23
End: 2018-07-27
Payer: OTHER MISCELLANEOUS

## 2018-07-27 DIAGNOSIS — M25.561 ACUTE PAIN OF RIGHT KNEE: ICD-10-CM

## 2018-07-27 DIAGNOSIS — R60.9 EDEMA, UNSPECIFIED TYPE: ICD-10-CM

## 2018-07-27 DIAGNOSIS — M25.562 ACUTE PAIN OF LEFT KNEE: Primary | ICD-10-CM

## 2018-07-27 PROCEDURE — 97140 MANUAL THERAPY 1/> REGIONS: CPT | Mod: GP | Performed by: PHYSICAL THERAPIST

## 2018-07-27 PROCEDURE — 97035 APP MDLTY 1+ULTRASOUND EA 15: CPT | Mod: GP | Performed by: PHYSICAL THERAPIST

## 2018-07-27 RX ORDER — DEXAMETHASONE SODIUM PHOSPHATE 4 MG/ML
INJECTION, SOLUTION INTRA-ARTICULAR; INTRALESIONAL; INTRAMUSCULAR; INTRAVENOUS; SOFT TISSUE
Qty: 30 ML | Refills: 0 | Status: SHIPPED | OUTPATIENT
Start: 2018-07-27 | End: 2019-05-01

## 2018-07-27 NOTE — PROGRESS NOTES
Subjective:  HPI                    Objective:  System    Physical Exam    General     ROS    Assessment/Plan:    PROGRESS  REPORT    Progress reporting period is from 6/11/18 to 7/27/18.     SUBJECTIVE  Subjective: L MRI shows inflammation and bursitis. Tried ionto at the theatre yesterday with the PT. Has not danced since Sunday. Will stay out for the remainder of the week.       Initial Pain level: 5/10        ;   ,     The objective findings are from DOS 7/27/18.    OBJECTIVE  Objective: ttp patellar tendon and fat pad B, no ROM restrictions into knee flex/ext. B hip flexor tightness. General deconditioning d/t activity modifciation from pain.       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  Pain -  hot/cold therapy, manual therapy, splint/taping/bracing/orthotics, self management, education and home program  Decreased joint mobility - manual therapy, therapeutic exercise and home program  Decreased strength - therapeutic exercise, therapeutic activities and home program  Decreased proprioception - neuro re-education, therapeutic activities and home program  Inflammation - cold therapy, electric stimulation, iontophoresis and self management/home program  Edema - cold therapy and self management/home program  STG/LTGs have been met or progress has been made towards goals:  Yes (See Goal flow sheet completed today.)  Assessment of Progress: The patient's progress has slowed.  The patient has had set backs in their progress.  Self Management Plans:  Patient has been instructed in a home treatment program.  Patient  has been instructed in self management of symptoms.  I have re-evaluated this patient and find that the nature, scope, duration and intensity of the therapy is appropriate for the medical condition of the patient.  Val continues to require the following intervention to meet STG and LTG's: PT  The patient is returning to  your office for a recheck appointment.    Recommendations:  This patient would benefit from continued therapy.     Frequency:  2 X week, once daily  Duration:  for 4 weeks tapering to 1 X a week over 4 weeks  Please send new orders for continued skilled PT        Please refer to the daily flowsheet for treatment today, total treatment time and time spent performing 1:1 timed codes.

## 2018-07-27 NOTE — MR AVS SNAPSHOT
After Visit Summary   7/27/2018    Val Jenkins    MRN: [id number]           Patient Information     Date Of Birth          1995        Visit Information        Provider Department      7/27/2018 11:30 AM Livier Rosas PT [facility] Physical Therapy        Today's Diagnoses     Edema, unspecified type        Acute pain of right knee           Follow-ups after your visit        Your next 10 appointments already scheduled     Jul 30, 2018 12:10 PM CDT   DWIGHT Dance with Livier Rosas PT   [facility] Physical Therapy ([facility]  )    [address] [place] [address]   [phone]            Aug 03, 2018 12:10 PM CDT   DWIGHT Dance with Livier Rosas PT   [facility] Physical Therapy ([facility]  )    [address] [place] [address]   [phone]            Aug 06, 2018 12:10 PM CDT   DWIGHT Dance with Livier Rosas PT   [facility] Physical Therapy ([facility]  )    [address] [place] [address]   [phone]            Aug 10, 2018 11:30 AM CDT   DWIGHT Dance with Livier Rosas PT   [facility] Physical Therapy ([facility]  )    [address] [place] [address]   [phone]            Aug 15, 2018 11:30 AM CDT   DWIGHT Dance with Livier Rosas PT   [place] [facility] Physical Therapy ([facility]  )    [address] [place] [address]   [phone]            Aug 17, 2018 11:30 AM CDT   DWIGHT Dance with Livier Rosas PT   [facility] Physical Therapy ([facility]  )    [address] [place] [address]   [phone]            Aug 20, 2018 11:30 AM CDT   DWIGHT Dance with Livier Rosas PT   [facility] Physical  Therapy ([facility]  )    [address] [facility] [address] [phone]            Aug 24, 2018 11:30 AM CDT   IAM Dance with Livier Rosas PT   [facility] Physical Therapy ([facility]  )    [address] [facility] [address] [phone]              Who to contact     If you have questions or need follow up information about today's clinic visit or your schedule please contact [facility] PHYSICAL THERAPY directly at [phone].  Normal or non-critical lab and imaging results will be communicated to you by MyChart, letter or phone within 4 business days after the clinic has received the results. If you do not hear from us within 7 days, please contact the clinic through [employer] or phone. If you have a critical or abnormal lab result, we will notify you by phone as soon as possible.  Submit refill requests through [employer] or call your pharmacy and they will forward the refill request to us. Please allow 3 business days for your refill to be completed.          Additional Information About Your Visit        [employer] Information     [employer] gives you secure access to your electronic health record. If you see a primary care provider, you can also send messages to your care team and make appointments. If you have questions, please call your primary care clinic.  If you do not have a primary care provider, please call [phone] and they will assist you.        Care EveryWhere ID     This is your Care EveryWhere ID. This could be used by other organizations to access your [place] medical records  [id number]        Your Vitals Were     Last Period                   07/11/2018            Blood Pressure from Last 3 Encounters:   07/24/18 115/57   07/14/18 93/61   07/12/18 94/60    Weight from Last 3 Encounters:   07/24/18 55.8 kg (123 lb)   07/14/18 55.8 kg (123 lb)   07/12/18 55.8 kg (123 lb)              We  Performed the Following     DWIGHT Progress Notes Report     Manual Ther Tech, 1+Regions, EA 15 min     Ultrasound Therapy        Primary Care Provider Fax #    Physician No Ref-Primary [phone]       No address on file        Equal Access to Services     WILMER CARLOS : Julius sarika leal gibson Marino, waalexda luvicenta, qaybta karoberto carlos borja, neeraj maloney. So [facility] [phone].    ATENCIÓN: Si habla español, tiene a reed disposición servicios gratuitos de asistencia lingüística. Llame al [phone].    We comply with applicable federal civil rights laws and [place] laws. We do not discriminate on the basis of race, color, national origin, age, disability, sex, sexual orientation, or gender identity.            Thank you!     Thank you for choosing [place] [facility] PHYSICAL THERAPY  for your care. Our goal is always to provide you with excellent care. Hearing back from our patients is one way we can continue to improve our services. Please take a few minutes to complete the written survey that you may receive in the mail after your visit with us. Thank you!             Your Updated Medication List - Protect others around you: Learn how to safely use, store and throw away your medicines at www.disposemymeds.org.          This list is accurate as of 7/27/18  1:27 PM.  Always use your most recent med list.                   Brand Name Dispense Instructions for use Diagnosis    diclofenac 75 MG EC tablet    VOLTAREN    60 tablet    Take 1 tablet (75 mg) by mouth 2 times daily as needed for moderate pain    Arthralgia of lower leg, unspecified laterality       HYDROcodone-acetaminophen 5-325 MG per tablet    NORCO    18 tablet    Take 1 tablet by mouth 3 times daily as needed for pain    Right knee pain, unspecified chronicity       norethin-eth estrad triphasic 0.5/1/0.5-35 MG-MCG per tablet    PAKO BENSON, TRI-NORINYL          traMADol 50 MG tablet     ULTRAM    12 tablet    Take 1 tablet (50 mg) by mouth every 6 hours as needed for severe pain    Patellofemoral joint pain, left

## 2018-07-30 ENCOUNTER — THERAPY VISIT (OUTPATIENT)
Dept: PHYSICAL THERAPY | Facility: CLINIC | Age: 23
End: 2018-07-30
Payer: OTHER MISCELLANEOUS

## 2018-07-30 DIAGNOSIS — R60.9 EDEMA, UNSPECIFIED TYPE: ICD-10-CM

## 2018-07-30 DIAGNOSIS — M25.561 ACUTE PAIN OF RIGHT KNEE: ICD-10-CM

## 2018-07-30 PROCEDURE — 97140 MANUAL THERAPY 1/> REGIONS: CPT | Mod: GP | Performed by: PHYSICAL THERAPIST

## 2018-07-30 PROCEDURE — 97035 APP MDLTY 1+ULTRASOUND EA 15: CPT | Mod: GP | Performed by: PHYSICAL THERAPIST

## 2018-08-01 DIAGNOSIS — M25.561 BILATERAL ANTERIOR KNEE PAIN: Primary | ICD-10-CM

## 2018-08-01 DIAGNOSIS — M25.562 BILATERAL ANTERIOR KNEE PAIN: Primary | ICD-10-CM

## 2018-08-01 RX ORDER — DICLOFENAC SODIUM 75 MG/1
75 TABLET, DELAYED RELEASE ORAL 2 TIMES DAILY
Qty: 60 TABLET | Refills: 0 | Status: SHIPPED | OUTPATIENT
Start: 2018-08-01 | End: 2019-05-01

## 2018-08-03 ENCOUNTER — THERAPY VISIT (OUTPATIENT)
Dept: PHYSICAL THERAPY | Facility: CLINIC | Age: 23
End: 2018-08-03
Payer: OTHER MISCELLANEOUS

## 2018-08-03 DIAGNOSIS — M25.561 ACUTE PAIN OF RIGHT KNEE: ICD-10-CM

## 2018-08-03 DIAGNOSIS — R60.9 EDEMA, UNSPECIFIED TYPE: ICD-10-CM

## 2018-08-03 PROCEDURE — 97112 NEUROMUSCULAR REEDUCATION: CPT | Mod: GP | Performed by: PHYSICAL THERAPIST

## 2018-08-03 PROCEDURE — 97110 THERAPEUTIC EXERCISES: CPT | Mod: GP | Performed by: PHYSICAL THERAPIST

## 2018-08-06 ENCOUNTER — THERAPY VISIT (OUTPATIENT)
Dept: PHYSICAL THERAPY | Facility: CLINIC | Age: 23
End: 2018-08-06
Payer: OTHER MISCELLANEOUS

## 2018-08-06 DIAGNOSIS — R60.9 EDEMA, UNSPECIFIED TYPE: ICD-10-CM

## 2018-08-06 DIAGNOSIS — M25.561 ACUTE PAIN OF RIGHT KNEE: ICD-10-CM

## 2018-08-06 PROCEDURE — 97112 NEUROMUSCULAR REEDUCATION: CPT | Mod: GP | Performed by: PHYSICAL THERAPIST

## 2018-08-06 PROCEDURE — 97140 MANUAL THERAPY 1/> REGIONS: CPT | Mod: GP | Performed by: PHYSICAL THERAPIST

## 2018-08-10 ENCOUNTER — THERAPY VISIT (OUTPATIENT)
Dept: PHYSICAL THERAPY | Facility: CLINIC | Age: 23
End: 2018-08-10
Payer: OTHER MISCELLANEOUS

## 2018-08-10 DIAGNOSIS — M25.561 ACUTE PAIN OF RIGHT KNEE: ICD-10-CM

## 2018-08-10 DIAGNOSIS — R60.9 EDEMA, UNSPECIFIED TYPE: ICD-10-CM

## 2018-08-10 PROCEDURE — 97112 NEUROMUSCULAR REEDUCATION: CPT | Mod: GP | Performed by: PHYSICAL THERAPIST

## 2018-08-10 PROCEDURE — 97110 THERAPEUTIC EXERCISES: CPT | Mod: GP | Performed by: PHYSICAL THERAPIST

## 2018-08-15 ENCOUNTER — THERAPY VISIT (OUTPATIENT)
Dept: PHYSICAL THERAPY | Facility: CLINIC | Age: 23
End: 2018-08-15
Payer: OTHER MISCELLANEOUS

## 2018-08-15 DIAGNOSIS — R60.9 EDEMA, UNSPECIFIED TYPE: ICD-10-CM

## 2018-08-15 DIAGNOSIS — M25.561 ACUTE PAIN OF RIGHT KNEE: ICD-10-CM

## 2018-08-15 PROCEDURE — 97110 THERAPEUTIC EXERCISES: CPT | Mod: GP | Performed by: PHYSICAL THERAPIST

## 2018-08-20 ENCOUNTER — THERAPY VISIT (OUTPATIENT)
Dept: PHYSICAL THERAPY | Facility: CLINIC | Age: 23
End: 2018-08-20
Payer: OTHER MISCELLANEOUS

## 2018-08-20 DIAGNOSIS — R60.9 EDEMA, UNSPECIFIED TYPE: ICD-10-CM

## 2018-08-20 DIAGNOSIS — M25.561 ACUTE PAIN OF RIGHT KNEE: ICD-10-CM

## 2018-08-20 PROCEDURE — 97140 MANUAL THERAPY 1/> REGIONS: CPT | Mod: GP | Performed by: PHYSICAL THERAPIST

## 2018-08-20 PROCEDURE — 97112 NEUROMUSCULAR REEDUCATION: CPT | Mod: GP | Performed by: PHYSICAL THERAPIST

## 2018-08-20 PROCEDURE — 97110 THERAPEUTIC EXERCISES: CPT | Mod: GP | Performed by: PHYSICAL THERAPIST

## 2018-08-22 DIAGNOSIS — M22.2X2 PATELLOFEMORAL PAIN SYNDROME OF BOTH KNEES: Primary | ICD-10-CM

## 2018-08-22 DIAGNOSIS — M22.2X1 PATELLOFEMORAL PAIN SYNDROME OF BOTH KNEES: Primary | ICD-10-CM

## 2018-08-22 RX ORDER — METHYLPREDNISOLONE 4 MG
TABLET, DOSE PACK ORAL
Qty: 21 TABLET | Refills: 0 | Status: SHIPPED | OUTPATIENT
Start: 2018-08-22 | End: 2019-05-01

## 2018-08-24 ENCOUNTER — THERAPY VISIT (OUTPATIENT)
Dept: PHYSICAL THERAPY | Facility: CLINIC | Age: 23
End: 2018-08-24
Payer: OTHER MISCELLANEOUS

## 2018-08-24 DIAGNOSIS — R60.9 EDEMA, UNSPECIFIED TYPE: ICD-10-CM

## 2018-08-24 DIAGNOSIS — M25.561 ACUTE PAIN OF RIGHT KNEE: ICD-10-CM

## 2018-08-24 PROCEDURE — 97140 MANUAL THERAPY 1/> REGIONS: CPT | Mod: GP | Performed by: PHYSICAL THERAPIST

## 2018-08-24 PROCEDURE — 97110 THERAPEUTIC EXERCISES: CPT | Mod: GP | Performed by: PHYSICAL THERAPIST

## 2018-08-27 ENCOUNTER — THERAPY VISIT (OUTPATIENT)
Dept: PHYSICAL THERAPY | Facility: CLINIC | Age: 23
End: 2018-08-27
Payer: OTHER MISCELLANEOUS

## 2018-08-27 DIAGNOSIS — M25.561 ACUTE PAIN OF RIGHT KNEE: ICD-10-CM

## 2018-08-27 DIAGNOSIS — R60.9 EDEMA, UNSPECIFIED TYPE: ICD-10-CM

## 2018-08-27 PROCEDURE — 97140 MANUAL THERAPY 1/> REGIONS: CPT | Mod: GP | Performed by: PHYSICAL THERAPIST

## 2018-08-27 NOTE — PROGRESS NOTES
Subjective:  HPI                    Objective:  System    Physical Exam    General     ROS    Assessment/Plan:    DISCHARGE REPORT    Progress reporting period is from 5/21/18 to 8/27/18.     SUBJECTIVE  Subjective: Final weekend of shows. Pain is the worst it has been.       Initial Pain level: 5/10   Changes in function: Yes, see goal flow sheet for change in function   Adverse reactions: Activity:;   , Adverse reaction activity: shows, inc pain   The objective findings are from DOS 8/27/18.    OBJECTIVE  Objective: B LE bruising along med calf, quad and adductor B. Pt states she has been rolling her muscles throughout the weekend. B QS diminished and painful. dec hip flexor mob B. Antalgic gait.      ASSESSMENT/PLAN  Updated problem list and treatment plan: Diagnosis 1:  B patellafemoral pain  Pain -  hot/cold therapy, manual therapy, splint/taping/bracing/orthotics, self management, education and home program  Decreased strength - therapeutic exercise, therapeutic activities and home program  Decreased proprioception - neuro re-education, therapeutic activities and home program  Inflammation - cold therapy and self management/home program  Edema - cold therapy and self management/home program  Impaired gait - gait training and home program  STG/LTGs have been met or progress has been made towards goals:  Yes (See Goal flow sheet completed today.)  Assessment of Progress: The patient's progress has slowed.  The patient has had set backs in their progress.  Self Management Plans:  Patient has been instructed in a home treatment program.  Patient  has been instructed in self management of symptoms.  I have re-evaluated this patient and find that the nature, scope, duration and intensity of the therapy is appropriate for the medical condition of the patient.  Val continues to require the following intervention to meet STG and LTG's: PT intervention is no longer required to meet  STG/LTG.      Recommendations:  This patient would benefit from continued therapy.   Val is leaving [facility] for 6 wks, will re-evaluate upon return.        Please refer to the daily flowsheet for treatment today, total treatment time and time spent performing 1:1 timed codes.

## 2018-08-27 NOTE — MR AVS SNAPSHOT
After Visit Summary   8/27/2018    Val Jenkins    MRN: [id number]           Patient Information     Date Of Birth          1995        Visit Information        Provider Department      8/27/2018 12:10 PM Livier Rosas PT [facility] Physical [facility]        Today's Diagnoses     Edema, unspecified type        Acute pain of right knee           Follow-ups after your visit        Who to contact     If you have questions or need follow up information about today's clinic visit or your schedule please contact [facility] ATHLETIC [facility] PHYSICAL [place] directly at [phone].  Normal or non-critical lab and imaging results will be communicated to you by [employer]hart, letter or phone within 4 business days after the clinic has received the results. If you do not hear from us within 7 days, please contact the clinic through [employer]hart or phone. If you have a critical or abnormal lab result, we will notify you by phone as soon as possible.  Submit refill requests through [employer] or call your pharmacy and they will forward the refill request to us. Please allow 3 business days for your refill to be completed.          Additional Information About Your Visit        MyChart Information     [employer] gives you secure access to your electronic health record. If you see a primary care provider, you can also send messages to your care team and make appointments. If you have questions, please call your primary care clinic.  If you do not have a primary care provider, please call [phone] and they will assist you.        Care EveryWhere ID     This is your Care EveryWhere ID. This could be used by other organizations to access your [facility] medical records  [id number]         Blood Pressure from Last 3 Encounters:   07/24/18 115/57   07/14/18 93/61   07/12/18 94/60    Weight from Last 3 Encounters:   07/24/18 55.8 kg (123 lb)   07/14/18 55.8 kg (123  lb)   07/12/18 55.8 kg (123 lb)              We Performed the Following     DWIGHT Progress Notes Report     Manual Ther Tech, 1+Regions, EA 15 min        Primary Care Provider Fax #    Physician No Ref-Primary [phone]       No address on file        Equal Access to Services     WILMER GONZALEZ : Hadchristiana baum ku steveo Soomaali, waaxda luqadaha, qaybta kaalmada adeegyada, neeraj greenen greg michele ladelmabasilia . So [facility] [phone].    ATENCIÓN: Si habla español, tiene a reed disposición servicios gratuitos de asistencia lingüística. Llame al [phone].    We comply with applicable federal civil rights laws and [place] laws. We do not discriminate on the basis of race, color, national origin, age, disability, sex, sexual orientation, or gender identity.            Thank you!     Thank you for choosing [facility] ATHLETIC [facility]IC[facility] PHYSICAL THERAPY  for your care. Our goal is always to provide you with excellent care. Hearing back from our patients is one way we can continue to improve our services. Please take a few minutes to complete the written survey that you may receive in the mail after your visit with us. Thank you!             Your Updated Medication List - Protect others around you: Learn how to safely use, store and throw away your medicines at www.disposemymeds.org.          This list is accurate as of 8/27/18  1:44 PM.  Always use your most recent med list.                   Brand Name Dispense Instructions for use Diagnosis    dexamethasone 4 MG/ML injection    DECADRON    30 mL    Use 4 mg or dose determined by provider for iontophoresis.    Acute pain of left knee       * diclofenac 75 MG EC tablet    VOLTAREN    60 tablet    Take 1 tablet (75 mg) by mouth 2 times daily as needed for moderate pain    Arthralgia of lower leg, unspecified laterality       * diclofenac 75 MG EC tablet    VOLTAREN    60 tablet    Take 1 tablet (75 mg) by mouth 2 times daily    Bilateral anterior knee  pain       HYDROcodone-acetaminophen 5-325 MG per tablet    NORCO    18 tablet    Take 1 tablet by mouth 3 times daily as needed for pain    Right knee pain, unspecified chronicity       methylPREDNISolone 4 MG tablet    MEDROL DOSEPAK    21 tablet    Follow package instructions    Patellofemoral pain syndrome of both knees       norethin-eth estrad triphasic 0.5/1/0.5-35 MG-MCG per tablet    PAKO BENSON, TRI-NORINYL          traMADol 50 MG tablet    ULTRAM    12 tablet    Take 1 tablet (50 mg) by mouth every 6 hours as needed for severe pain    Patellofemoral joint pain, left       * Notice:  This list has 2 medication(s) that are the same as other medications prescribed for you. Read the directions carefully, and ask your doctor or other care provider to review them with you.

## 2018-10-03 ENCOUNTER — TRANSFERRED RECORDS (OUTPATIENT)
Dept: HEALTH INFORMATION MANAGEMENT | Facility: CLINIC | Age: 23
End: 2018-10-03

## 2018-11-15 ENCOUNTER — TELEPHONE (OUTPATIENT)
Dept: ORTHOPEDICS | Facility: CLINIC | Age: 23
End: 2018-11-15

## 2018-11-15 ENCOUNTER — TRANSFERRED RECORDS (OUTPATIENT)
Dept: HEALTH INFORMATION MANAGEMENT | Facility: CLINIC | Age: 23
End: 2018-11-15

## 2018-11-15 NOTE — TELEPHONE ENCOUNTER
[facility] Call Center    Phone Message    May a detailed message be left on voicemail: yes    Reason for Call: Other: Pema NINA,  needs update on pt from Dr. Jen khoury to [phone], as  says they need this for pt to continue PT.     Action Taken: Message routed to:  [facility] Ortho

## 2018-11-16 NOTE — TELEPHONE ENCOUNTER
Pema NINA was called back, left a VM. Because Dr. Li hasn't seen Val since July, we are unable to provide an update for work comp. It was recommended she schedule a follow up appointment with Dr. Li in order to update restrictions. Phone number was provided for the walk-in clinic in case Pema has any further questions.

## 2018-12-05 ENCOUNTER — TELEPHONE (OUTPATIENT)
Dept: OTHER | Facility: CLINIC | Age: 23
End: 2018-12-05

## 2019-05-01 ENCOUNTER — OFFICE VISIT (OUTPATIENT)
Dept: ORTHOPEDICS | Facility: CLINIC | Age: 24
End: 2019-05-01
Payer: COMMERCIAL

## 2019-05-01 DIAGNOSIS — M22.2X2 PATELLOFEMORAL PAIN SYNDROME OF BOTH KNEES: ICD-10-CM

## 2019-05-01 DIAGNOSIS — M25.869 IMPINGEMENT SYNDROME INVOLVING PATELLAR FAT PAD: Primary | ICD-10-CM

## 2019-05-01 DIAGNOSIS — M22.2X1 PATELLOFEMORAL PAIN SYNDROME OF BOTH KNEES: ICD-10-CM

## 2019-05-01 NOTE — PROGRESS NOTES
"[facility]  Orthopedics  Lisandro Li, DO  2019     Name: Val Jenkins  MRN: [id number]  Age: 24 year old  : 1995  Referring provider: Referred Self     Chief Complaint:   Bilateral knee pain      History of Present Illness:   Val Jenkins is a 24 year old  female who presents today for follow-up regarding bilateral knee pain, left greater than right. At the patient's last visit with me on 18, she expressed recently increasing pain in both of her knees. Based on her symptoms, MRI of the left knee was ordered with concern for meniscal tear; please see results below. Treatment to date has otherwise included physical therapy, HEP, Diclofenac tabs, topical antiinflammatory gel, taping techniques, and ice. Following her last visit, Tramadol was provided.     Today, the patient reports that she had been doing physical therapy, but this did not improve her symptoms as she had hoped. 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 This has been very helpful doing this monthly on both knees. She does have intermittent flares of pain in both her knees, which \"sets her back\" and she has not been able to return to dancing. Due to her most recent flare continuing over this last week she presents to the Walk-in-Clinic for another physical therapy referral. She ices her knees frequently. She has not been taking anti-inflammatories.     The patient also expresses that she began to have a a \"nerve pain\" in the dorsum of her left foot about two months ago. This has been evaluated, but no etiology has been found. This pain has gotten somewhat better since onset, however, it has not completely resolved. The pain is positional and most exacerbated when her leg is extended.     Review of Systems:   A 10-point review of systems was obtained and is negative except for as noted " in the HPI.     Physical Examination:  General  - normal appearance, in no obvious distress  CV  - normal popliteal pulse  Pulm  - normal respiratory pattern, non-labored  Musculoskeletal - bilateral knees  - stance: normal gait without limp, normal single leg squat, no obvious leg length discrepancy  - inspection: no swelling or effusion, normal bone and joint alignment, no obvious deformity  - palpation: no joint line tenderness, Left knee tenderness to palpation at the distal quadriceps tendon, tenderness at the medial patellar facet inferiorly. Right knee tenderness at the distal aspect of the patellar tendon.   - ROM: 135 degrees flexion, -5 degrees extension, not painful, normal actively and passively compared to contralateral  - strength: 5/5 in flexion, 5/5 in extension  - special tests:  (-) Lachman  (-) anterior drawer  (-) posterior drawer  (-) Baron  (-) varus at 0 and 30 degrees flexion  (-) valgus at 0 and 30 degrees flexion  (-) [facility] s compression test  Neuro  - no sensory or motor deficit, grossly normal coordination, normal muscle tone  Skin  - no ecchymosis, erythema, warmth, or induration, no obvious rash  Psych  - interactive, appropriate, normal mood and affect    Imaging:  MRI of the left knee wo contrast 7/25/18:  Impression:  1. Menisci, cruciate ligaments, median lateral supporting structures,  and extensor mechanism are intact.  2. Tibial tuberosity-trochlear groove (TT-TG) distance measuring up to  19 mm, which is upper limit of the normal. There is subtle lateral  tilt of the patella. No appreciable cartilage defect in the  patellofemoral compartment. Constellation of these findings are  suggestive of mild patellar maltracking. Correlate clinically.  2. Minimal prepatellar and pretibial bursitis  Per radiology report     Assessment:   24 year old female presenting for follow-up regarding bilateral anterior knee pain. Over the last 6 months, she has sought treatment in [place]  discontinue where her family lives. Physical therapy has been helping moderately. She is now living in the [place] and would like to continue physical therapy.     Plan:     Referral to formal physical therapy.     Continue exercises and avoid aggravating activities.     Icing discussed    Consider fat pad hydrodissections in the future for recurrence of impingement syndrome.     In regard to her foot pain, I discussed options including and EMG as she had no significant findings on an MRI.     Discuss soft tissue release with physical therapy for her foot pain.     Follow-up as needed.     Scribe Disclosure:  I, Ezequiel Angeles, am serving as a scribe to document services personally performed by Lisandro Li,  at this visit, based upon the provider's statements to me. All documentation has been reviewed by the aforementioned provider prior to being entered into the official medical record.

## 2019-05-01 NOTE — PROGRESS NOTES
SPORTS & ORTHOPEDIC WALK-IN FOLLOW-UP VISIT 5/1/2019    Interval History:     Follow up reason: bilateral knee pain patient requesting continued formal PT.     Date of injury: none    Date last seen: 7/24/18    Following Therapeutic Plan: Yes     Pain: Improving    Function: Improving    Interval History:

## 2019-05-01 NOTE — LETTER
"2019       RE: Val Jenkins  [address]  [place] [address]     Dear Colleague,    Thank you for referring your patient, Val Jenkins, to the [facility] SPORTS AND ORTHOPAEDIC WALK IN CLINIC at [facility]. Please see a copy of my visit note below.    [facility]  Orthopedics  Lisandro Li, DO  2019     Name: Val Jenkins  MRN: [id number]  Age: 24 year old  : 1995  Referring provider: Referred Self     Chief Complaint:   Bilateral knee pain      History of Present Illness:   Val Jenkins is a 24 year old  female who presents today for follow-up regarding bilateral knee pain, left greater than right. At the patient's last visit with me on 18, she expressed recently increasing pain in both of her knees. Based on her symptoms, MRI of the left knee was ordered with concern for meniscal tear; please see results below. Treatment to date has otherwise included physical therapy, HEP, Diclofenac tabs, topical antiinflammatory gel, taping techniques, and ice. Following her last visit, Tramadol was provided.     Today, the patient reports that she had been doing physical therapy, but this did not improve her symptoms as she had hoped. 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 This has been very helpful doing this monthly on both knees. She does have intermittent flares of pain in both her knees, which \"sets her back\" and she has not been able to return to dancing. Due to her most recent flare continuing over this last week she presents to the [facility]in[facility]inic for another physical therapy referral. She ices her knees frequently. She has not been taking anti-inflammatories.     The patient also expresses that she began to have a a \"nerve pain\" in the dorsum of her left foot about two months ago. " This has been evaluated, but no etiology has been found. This pain has gotten somewhat better since onset, however, it has not completely resolved. The pain is positional and most exacerbated when her leg is extended.     Review of Systems:   A 10-point review of systems was obtained and is negative except for as noted in the HPI.     Physical Examination:  General  - normal appearance, in no obvious distress  CV  - normal popliteal pulse  Pulm  - normal respiratory pattern, non-labored  Musculoskeletal - bilateral knees  - stance: normal gait without limp, normal single leg squat, no obvious leg length discrepancy  - inspection: no swelling or effusion, normal bone and joint alignment, no obvious deformity  - palpation: no joint line tenderness, Left knee tenderness to palpation at the distal quadriceps tendon, tenderness at the medial patellar facet inferiorly. Right knee tenderness at the distal aspect of the patellar tendon.   - ROM: 135 degrees flexion, -5 degrees extension, not painful, normal actively and passively compared to contralateral  - strength: 5/5 in flexion, 5/5 in extension  - special tests:  (-) Lachman  (-) anterior drawer  (-) posterior drawer  (-) Baron  (-) varus at 0 and 30 degrees flexion  (-) valgus at 0 and 30 degrees flexion  (-) Emory s compression test  Neuro  - no sensory or motor deficit, grossly normal coordination, normal muscle tone  Skin  - no ecchymosis, erythema, warmth, or induration, no obvious rash  Psych  - interactive, appropriate, normal mood and affect    Imaging:  MRI of the left knee wo contrast 7/25/18:  Impression:  1. Menisci, cruciate ligaments, median lateral supporting structures,  and extensor mechanism are intact.  2. Tibial tuberosity-trochlear groove (TT-TG) distance measuring up to  19 mm, which is upper limit of the normal. There is subtle lateral  tilt of the patella. No appreciable cartilage defect in the  patellofemoral compartment. Constellation of  these findings are  suggestive of mild patellar maltracking. Correlate clinically.  2. Minimal prepatellar and pretibial bursitis  Per radiology report     Assessment:   24 year old female presenting for follow-up regarding bilateral anterior knee pain. Over the last 6 months, she has sought treatment in [facility] where her family lives. Physical therapy has been helping moderately. She is now living in the [place] and would like to continue physical therapy.     Plan:     Referral to formal physical therapy.     Continue exercises and avoid aggravating activities.     Icing discussed    Consider fat pad hydrodissections in the future for recurrence of impingement syndrome.     In regard to her foot pain, I discussed options including and EMG as she had no significant findings on an MRI.     Discuss soft tissue release with physical therapy for her foot pain.     Follow-up as needed.     Scribe Disclosure:  I, Ezequiel Angeles, am serving as a scribe to document services personally performed by Lisandro Li DO at this visit, based upon the provider's statements to me. All documentation has been reviewed by the aforementioned provider prior to being entered into the official medical record.                     SPORTS & ORTHOPEDIC WALK-IN FOLLOW-UP VISIT 5/1/2019    Interval History:     Follow up reason: bilateral knee pain patient requesting continued formal PT.     Date of injury: none    Date last seen: 7/24/18    Following Therapeutic Plan: Yes     Pain: Improving    Function: Improving    Interval History:             Again, thank you for allowing me to participate in the care of your patient.      Sincerely,    Lisandro Li DO

## 2019-07-23 NOTE — PROGRESS NOTES
Patient did not return for further treatment and no additional progress was noted.  Please refer to the progress note and goal flowsheet completed on 08/27/18 for discharge information.

## 2019-07-26 PROBLEM — M25.561 KNEE PAIN, RIGHT: Status: RESOLVED | Noted: 2018-05-22 | Resolved: 2019-07-26

## 2019-07-26 PROBLEM — R60.9 EDEMA, UNSPECIFIED TYPE: Status: RESOLVED | Noted: 2018-07-06 | Resolved: 2019-07-26

## 2020-03-10 ENCOUNTER — HEALTH MAINTENANCE LETTER (OUTPATIENT)
Age: 25
End: 2020-03-10

## 2020-12-27 ENCOUNTER — HEALTH MAINTENANCE LETTER (OUTPATIENT)
Age: 25
End: 2020-12-27

## 2021-04-24 ENCOUNTER — HEALTH MAINTENANCE LETTER (OUTPATIENT)
Age: 26
End: 2021-04-24

## 2021-10-04 ENCOUNTER — HEALTH MAINTENANCE LETTER (OUTPATIENT)
Age: 26
End: 2021-10-04

## 2022-05-15 ENCOUNTER — HEALTH MAINTENANCE LETTER (OUTPATIENT)
Age: 27
End: 2022-05-15

## 2022-09-11 ENCOUNTER — HEALTH MAINTENANCE LETTER (OUTPATIENT)
Age: 27
End: 2022-09-11

## 2023-06-03 ENCOUNTER — HEALTH MAINTENANCE LETTER (OUTPATIENT)
Age: 28
End: 2023-06-03